# Patient Record
Sex: MALE | Race: WHITE | NOT HISPANIC OR LATINO | URBAN - METROPOLITAN AREA
[De-identification: names, ages, dates, MRNs, and addresses within clinical notes are randomized per-mention and may not be internally consistent; named-entity substitution may affect disease eponyms.]

---

## 2020-01-29 ENCOUNTER — INPATIENT (INPATIENT)
Facility: HOSPITAL | Age: 65
LOS: 5 days | Discharge: HOME HEALTH | DRG: 539 | End: 2020-02-04
Attending: NEUROLOGICAL SURGERY | Admitting: NEUROLOGICAL SURGERY
Payer: COMMERCIAL

## 2020-01-29 VITALS
HEART RATE: 94 BPM | TEMPERATURE: 98 F | WEIGHT: 210.1 LBS | DIASTOLIC BLOOD PRESSURE: 97 MMHG | SYSTOLIC BLOOD PRESSURE: 173 MMHG | RESPIRATION RATE: 17 BRPM | OXYGEN SATURATION: 94 % | HEIGHT: 72 IN

## 2020-01-29 DIAGNOSIS — Z98.890 OTHER SPECIFIED POSTPROCEDURAL STATES: Chronic | ICD-10-CM

## 2020-01-29 LAB
CRP SERPL-MCNC: 15.86 MG/DL — HIGH (ref 0–0.4)
ERYTHROCYTE [SEDIMENTATION RATE] IN BLOOD: 116 MM/HR — HIGH

## 2020-01-29 PROCEDURE — 99223 1ST HOSP IP/OBS HIGH 75: CPT

## 2020-01-29 PROCEDURE — 72156 MRI NECK SPINE W/O & W/DYE: CPT | Mod: 26

## 2020-01-29 PROCEDURE — 99285 EMERGENCY DEPT VISIT HI MDM: CPT

## 2020-01-29 RX ORDER — DEXTROSE 50 % IN WATER 50 %
15 SYRINGE (ML) INTRAVENOUS ONCE
Refills: 0 | Status: DISCONTINUED | OUTPATIENT
Start: 2020-01-29 | End: 2020-01-31

## 2020-01-29 RX ORDER — ACETAMINOPHEN 500 MG
650 TABLET ORAL EVERY 6 HOURS
Refills: 0 | Status: DISCONTINUED | OUTPATIENT
Start: 2020-01-29 | End: 2020-01-31

## 2020-01-29 RX ORDER — SODIUM CHLORIDE 9 MG/ML
1000 INJECTION INTRAMUSCULAR; INTRAVENOUS; SUBCUTANEOUS
Refills: 0 | Status: DISCONTINUED | OUTPATIENT
Start: 2020-01-29 | End: 2020-01-31

## 2020-01-29 RX ORDER — ONDANSETRON 8 MG/1
4 TABLET, FILM COATED ORAL EVERY 6 HOURS
Refills: 0 | Status: DISCONTINUED | OUTPATIENT
Start: 2020-01-29 | End: 2020-01-31

## 2020-01-29 RX ORDER — GLUCAGON INJECTION, SOLUTION 0.5 MG/.1ML
1 INJECTION, SOLUTION SUBCUTANEOUS ONCE
Refills: 0 | Status: DISCONTINUED | OUTPATIENT
Start: 2020-01-29 | End: 2020-01-31

## 2020-01-29 RX ORDER — ONDANSETRON 8 MG/1
4 TABLET, FILM COATED ORAL ONCE
Refills: 0 | Status: COMPLETED | OUTPATIENT
Start: 2020-01-29 | End: 2020-01-29

## 2020-01-29 RX ORDER — SODIUM CHLORIDE 9 MG/ML
1000 INJECTION, SOLUTION INTRAVENOUS
Refills: 0 | Status: DISCONTINUED | OUTPATIENT
Start: 2020-01-29 | End: 2020-01-31

## 2020-01-29 RX ORDER — MORPHINE SULFATE 50 MG/1
4 CAPSULE, EXTENDED RELEASE ORAL ONCE
Refills: 0 | Status: DISCONTINUED | OUTPATIENT
Start: 2020-01-29 | End: 2020-01-29

## 2020-01-29 RX ORDER — DEXTROSE 50 % IN WATER 50 %
12.5 SYRINGE (ML) INTRAVENOUS ONCE
Refills: 0 | Status: DISCONTINUED | OUTPATIENT
Start: 2020-01-29 | End: 2020-01-31

## 2020-01-29 RX ADMIN — ONDANSETRON 4 MILLIGRAM(S): 8 TABLET, FILM COATED ORAL at 21:15

## 2020-01-29 RX ADMIN — MORPHINE SULFATE 4 MILLIGRAM(S): 50 CAPSULE, EXTENDED RELEASE ORAL at 21:14

## 2020-01-29 RX ADMIN — MORPHINE SULFATE 4 MILLIGRAM(S): 50 CAPSULE, EXTENDED RELEASE ORAL at 21:37

## 2020-01-29 NOTE — ED PROVIDER NOTE - OBJECTIVE STATEMENT
63 y/o M with infection diskitis secondary to viral infection, diagnosed by Dr. Jacob who recommended patient to come to ED for further evaluation. Pt has persistent neck pains and headache with no fevers at this time. Pt is A&Ox3 with no focal findings in the ED.

## 2020-01-29 NOTE — ED PROVIDER NOTE - CLINICAL SUMMARY MEDICAL DECISION MAKING FREE TEXT BOX
53 y/o M with infection diskitis. Labs ordered. Case discussed with Dr. Banegas and accepts patient for admission. Dr. Banegas states to hold abx for now until further ID input. 4mg of Morphine was given to pt for pain.

## 2020-01-29 NOTE — ED ADULT NURSE NOTE - OBJECTIVE STATEMENT
pt c/o neck pain and headache with malaise and intermittent fevers since dec that began while on a trip to [place].  pt was seen by neurosurg this week (hx of disc herniation and has private neurosurg) and was sent for labwork and MRI.  pt was told to report to ER for admission today for a viral infection in his spine.

## 2020-01-29 NOTE — ED CLERICAL - NS ED CLERK NOTE PRE-ARRIVAL INFORMATION; ADDITIONAL PRE-ARRIVAL INFORMATION
65 Y/O M ERI SILVA BEING SENT IN BY DR ALARCON FOR INFECTION DISKITIS C-1 AND C-2 ADMIT UNDER DR SHOOK  NEUROSURGERY [phone] DR CEASAR RODRIGUEZ FOR ID

## 2020-01-29 NOTE — ED ADULT NURSE NOTE - NSIMPLEMENTINTERV_GEN_ALL_ED
Implemented All Universal Safety Interventions:  [place] to call system. Call bell, personal items and telephone within reach. Instruct patient to call for assistance. Room bathroom lighting operational. Non-slip footwear when patient is off stretcher. Physically safe environment: no spills, clutter or unnecessary equipment. Stretcher in lowest position, wheels locked, appropriate side rails in place.

## 2020-01-29 NOTE — ED ADULT TRIAGE NOTE - CHIEF COMPLAINT QUOTE
Patient states, "I have an infection in my spine.  Dr. Jacob sent me in to be admitted."  Patient complaining of memory loss, fevers, neck pain and headache for several weeks.  Patient also complaining of numbness to left hand this morning, since resolved.  Patient denies any CP, SOB, or any other complaints at this time.

## 2020-01-30 DIAGNOSIS — M46.42 DISCITIS, UNSPECIFIED, CERVICAL REGION: ICD-10-CM

## 2020-01-30 LAB
ANION GAP SERPL CALC-SCNC: 11 MMOL/L — SIGNIFICANT CHANGE UP (ref 5–17)
BASOPHILS # BLD AUTO: 0.01 K/UL — SIGNIFICANT CHANGE UP (ref 0–0.2)
BASOPHILS NFR BLD AUTO: 0.2 % — SIGNIFICANT CHANGE UP (ref 0–2)
CHLORIDE SERPL-SCNC: 98 MMOL/L — SIGNIFICANT CHANGE UP (ref 96–108)
CO2 SERPL-SCNC: 26 MMOL/L — SIGNIFICANT CHANGE UP (ref 22–31)
EOSINOPHIL # BLD AUTO: 0.15 K/UL — SIGNIFICANT CHANGE UP (ref 0–0.5)
EOSINOPHIL NFR BLD AUTO: 3 % — SIGNIFICANT CHANGE UP (ref 0–6)
HBA1C BLD-MCNC: 5.6 % — SIGNIFICANT CHANGE UP (ref 4–5.6)
HCT VFR BLD CALC: 34.8 % — LOW (ref 39–50)
HCV AB S/CO SERPL IA: 0.18 S/CO — SIGNIFICANT CHANGE UP
HCV AB SERPL-IMP: SIGNIFICANT CHANGE UP
HGB BLD-MCNC: 11.3 G/DL — LOW (ref 13–17)
IMM GRANULOCYTES NFR BLD AUTO: 1 % — SIGNIFICANT CHANGE UP (ref 0–1.5)
LYMPHOCYTES # BLD AUTO: 0.75 K/UL — LOW (ref 1–3.3)
LYMPHOCYTES # BLD AUTO: 15.2 % — SIGNIFICANT CHANGE UP (ref 13–44)
MAGNESIUM SERPL-MCNC: 2.2 MG/DL — SIGNIFICANT CHANGE UP (ref 1.6–2.6)
MCHC RBC-ENTMCNC: 30.2 PG — SIGNIFICANT CHANGE UP (ref 27–34)
MCHC RBC-ENTMCNC: 32.5 GM/DL — SIGNIFICANT CHANGE UP (ref 32–36)
MCV RBC AUTO: 93 FL — SIGNIFICANT CHANGE UP (ref 80–100)
MONOCYTES # BLD AUTO: 0.6 K/UL — SIGNIFICANT CHANGE UP (ref 0–0.9)
MONOCYTES NFR BLD AUTO: 12.1 % — SIGNIFICANT CHANGE UP (ref 2–14)
NEUTROPHILS # BLD AUTO: 3.38 K/UL — SIGNIFICANT CHANGE UP (ref 1.8–7.4)
NEUTROPHILS NFR BLD AUTO: 68.5 % — SIGNIFICANT CHANGE UP (ref 43–77)
NRBC # BLD: 0 /100 WBCS — SIGNIFICANT CHANGE UP (ref 0–0)
PLATELET # BLD AUTO: 332 K/UL — SIGNIFICANT CHANGE UP (ref 150–400)
POTASSIUM SERPL-MCNC: 4.6 MMOL/L — SIGNIFICANT CHANGE UP (ref 3.5–5.3)
POTASSIUM SERPL-SCNC: 4.6 MMOL/L — SIGNIFICANT CHANGE UP (ref 3.5–5.3)
RBC # BLD: 3.74 M/UL — LOW (ref 4.2–5.8)
RBC # FLD: 11.9 % — SIGNIFICANT CHANGE UP (ref 10.3–14.5)
SODIUM SERPL-SCNC: 135 MMOL/L — SIGNIFICANT CHANGE UP (ref 135–145)
WBC # BLD: 4.94 K/UL — SIGNIFICANT CHANGE UP (ref 3.8–10.5)
WBC # FLD AUTO: 4.94 K/UL — SIGNIFICANT CHANGE UP (ref 3.8–10.5)

## 2020-01-30 PROCEDURE — 70450 CT HEAD/BRAIN W/O DYE: CPT | Mod: 26

## 2020-01-30 PROCEDURE — 72125 CT NECK SPINE W/O DYE: CPT | Mod: 26

## 2020-01-30 PROCEDURE — 71045 X-RAY EXAM CHEST 1 VIEW: CPT | Mod: 26

## 2020-01-30 PROCEDURE — 99232 SBSQ HOSP IP/OBS MODERATE 35: CPT

## 2020-01-30 RX ORDER — DIAZEPAM 5 MG
5 TABLET ORAL EVERY 6 HOURS
Refills: 0 | Status: DISCONTINUED | OUTPATIENT
Start: 2020-01-30 | End: 2020-01-31

## 2020-01-30 RX ORDER — HYDROMORPHONE HYDROCHLORIDE 2 MG/ML
0.5 INJECTION INTRAMUSCULAR; INTRAVENOUS; SUBCUTANEOUS
Refills: 0 | Status: DISCONTINUED | OUTPATIENT
Start: 2020-01-30 | End: 2020-01-31

## 2020-01-30 RX ORDER — OXYCODONE AND ACETAMINOPHEN 5; 325 MG/1; MG/1
1 TABLET ORAL EVERY 4 HOURS
Refills: 0 | Status: DISCONTINUED | OUTPATIENT
Start: 2020-01-30 | End: 2020-01-31

## 2020-01-30 RX ORDER — METOPROLOL TARTRATE 50 MG
100 TABLET ORAL DAILY
Refills: 0 | Status: DISCONTINUED | OUTPATIENT
Start: 2020-01-30 | End: 2020-01-31

## 2020-01-30 RX ORDER — POVIDONE-IODINE 5 %
1 AEROSOL (ML) TOPICAL ONCE
Refills: 0 | Status: DISCONTINUED | OUTPATIENT
Start: 2020-01-31 | End: 2020-01-31

## 2020-01-30 RX ORDER — HYDRALAZINE HCL 50 MG
10 TABLET ORAL
Refills: 0 | Status: DISCONTINUED | OUTPATIENT
Start: 2020-01-30 | End: 2020-01-31

## 2020-01-30 RX ADMIN — OXYCODONE AND ACETAMINOPHEN 1 TABLET(S): 5; 325 TABLET ORAL at 09:46

## 2020-01-30 RX ADMIN — OXYCODONE AND ACETAMINOPHEN 1 TABLET(S): 5; 325 TABLET ORAL at 05:43

## 2020-01-30 RX ADMIN — OXYCODONE AND ACETAMINOPHEN 1 TABLET(S): 5; 325 TABLET ORAL at 13:46

## 2020-01-30 RX ADMIN — OXYCODONE AND ACETAMINOPHEN 1 TABLET(S): 5; 325 TABLET ORAL at 14:46

## 2020-01-30 RX ADMIN — HYDROMORPHONE HYDROCHLORIDE 0.5 MILLIGRAM(S): 2 INJECTION INTRAMUSCULAR; INTRAVENOUS; SUBCUTANEOUS at 00:49

## 2020-01-30 RX ADMIN — OXYCODONE AND ACETAMINOPHEN 1 TABLET(S): 5; 325 TABLET ORAL at 18:48

## 2020-01-30 RX ADMIN — OXYCODONE AND ACETAMINOPHEN 1 TABLET(S): 5; 325 TABLET ORAL at 17:48

## 2020-01-30 RX ADMIN — Medication 100 MILLIGRAM(S): at 05:41

## 2020-01-30 RX ADMIN — OXYCODONE AND ACETAMINOPHEN 1 TABLET(S): 5; 325 TABLET ORAL at 10:46

## 2020-01-30 NOTE — PROGRESS NOTE ADULT - SUBJECTIVE AND OBJECTIVE BOX
Surgery:   Consent: Signed by patient vs HCP                   NAME/NUMBER of HCP:     penicillin (Unknown)      OVERNIGHT EVENTS:    T(C): 36.8 (01-30-20 @ 16:24), Max: 37 (01-30-20 @ 00:24)  HR: 84 (01-30-20 @ 16:24) (84 - 107)  BP: 139/88 (01-30-20 @ 16:24) (139/88 - 160/103)  RR: 17 (01-30-20 @ 16:24) (17 - 17)  SpO2: 94% (01-30-20 @ 16:24) (93% - 96%)  Wt(kg): --    EXAM:  Neuro: AOx3, NAD, coherent speech, following commands  CN II-XII: PERRL, EOMI, face symmetric  Motor: MAEx4, strength 5/5 UE and LE b/l, no drift   SILT throughout b/l    Cards: NSR  Resp: unlabored breathing on RA   GI: abd soft, NT, ND       01-30    135  |  98  |  x   ----------------------------<  x   4.6   |  26  |  x     Ca    9.8      29 Jan 2020 20:32  Mg     2.2     01-30    TPro  8.0  /  Alb  3.9  /  TBili  0.4  /  DBili  x   /  AST  26  /  ALT  21  /  AlkPhos  87  01-29    CBC Full  -  ( 30 Jan 2020 08:38 )  WBC Count : 4.94 K/uL  RBC Count : 3.74 M/uL  Hemoglobin : 11.3 g/dL  Hematocrit : 34.8 %  Platelet Count - Automated : 332 K/uL  Mean Cell Volume : 93.0 fl  Mean Cell Hemoglobin : 30.2 pg  Mean Cell Hemoglobin Concentration : 32.5 gm/dL  Auto Neutrophil # : 3.38 K/uL  Auto Lymphocyte # : 0.75 K/uL  Auto Monocyte # : 0.60 K/uL  Auto Eosinophil # : 0.15 K/uL  Auto Basophil # : 0.01 K/uL  Auto Neutrophil % : 68.5 %  Auto Lymphocyte % : 15.2 %  Auto Monocyte % : 12.1 %  Auto Eosinophil % : 3.0 %  Auto Basophil % : 0.2 %    PT/INR - ( 29 Jan 2020 20:32 )   PT: 14.8 sec;   INR: 1.29          PTT - ( 29 Jan 2020 20:32 )  PTT:41.6 sec    Pregnancy test:  Type & Screen (in past 72hrs):    CXR:  EKG:  ECHO:  MRSA swab:   Medical Clearances:  Other Clearances:     Last dose of antiplatelet/anticoagulation drug:    Implanted Devices (pacemaker, drug pump...etc):  []YES   [] NO                  If yes --> EPS consulted to interrogate/adjust device:                 Assessment: Male    Plan:  - OR tomorrow for   - NPO after midnight   - IVF at midnight  - Hold all AC  - 2 units pRBCs on hold   - F/u am labs, coags, T&S  - F/u urine preg, MRSA swab   - F/u CXR, EKG, echo  - Consent in chart   - Medically optimized for OR per    - D/w  Surgery: cervical biopsy, r/o osteo  Consent: Signed by patient vs HCP                   NAME/NUMBER of HCP: Consent in chart- signed by patient    penicillin (Unknown)      OVERNIGHT EVENTS:    T(C): 36.8 (01-30-20 @ 16:24), Max: 37 (01-30-20 @ 00:24)  HR: 84 (01-30-20 @ 16:24) (84 - 107)  BP: 139/88 (01-30-20 @ 16:24) (139/88 - 160/103)  RR: 17 (01-30-20 @ 16:24) (17 - 17)  SpO2: 94% (01-30-20 @ 16:24) (93% - 96%)  Wt(kg): --      EXAM:  Neuro: AOx3, NAD, coherent speech, following commands  CN II-XII grossly intact  Motor: MAEx4, strength 5/5 UE and LE b/l, no drift   SILT throughout b/l        01-30    135  |  98  |  x   ----------------------------<  x   4.6   |  26  |  x     Ca    9.8      29 Jan 2020 20:32  Mg     2.2     01-30    TPro  8.0  /  Alb  3.9  /  TBili  0.4  /  DBili  x   /  AST  26  /  ALT  21  /  AlkPhos  87  01-29    CBC Full  -  ( 30 Jan 2020 08:38 )  WBC Count : 4.94 K/uL  RBC Count : 3.74 M/uL  Hemoglobin : 11.3 g/dL  Hematocrit : 34.8 %  Platelet Count - Automated : 332 K/uL  Mean Cell Volume : 93.0 fl  Mean Cell Hemoglobin : 30.2 pg  Mean Cell Hemoglobin Concentration : 32.5 gm/dL  Auto Neutrophil # : 3.38 K/uL  Auto Lymphocyte # : 0.75 K/uL  Auto Monocyte # : 0.60 K/uL  Auto Eosinophil # : 0.15 K/uL  Auto Basophil # : 0.01 K/uL  Auto Neutrophil % : 68.5 %  Auto Lymphocyte % : 15.2 %  Auto Monocyte % : 12.1 %  Auto Eosinophil % : 3.0 %  Auto Basophil % : 0.2 %    PT/INR - ( 29 Jan 2020 20:32 )   PT: 14.8 sec;   INR: 1.29          PTT - ( 29 Jan 2020 20:32 )  PTT:41.6 sec    Pregnancy test: n/a  Type & Screen (in past 72hrs): pending in am    CXR: pending  EKG: pending   ECHO: pending   M3: pending in am  Medical Clearances: ID (Dr. Bhandari)  Other Clearances:     Last dose of antiplatelet/anticoagulation drug: n/a    Implanted Devices (pacemaker, drug pump...etc):  []YES   [x] NO                  If yes --> EPS consulted to interrogate/adjust device:                 Assessment: 65 yo Male preop for OR tomorrow 1/31    Plan:  - OR tomorrow for cervical biopsy, r/o osteo  - NPO after midnight (except meds)  - IVF at midnight  - Hold all AC  - 2 units pRBCs on hold   - F/u am labs, coags, T&S  - F/u M3 in am  - F/u CXR, EKG, echo  - Consent in chart   - Medically optimized for OR per Dr. Bhandari   - D/w Dr. Banegas

## 2020-01-30 NOTE — H&P ADULT - HISTORY OF PRESENT ILLNESS
Pt is a 64 y.o M PMH HTN presenting with neck pain and HA x 2 weeks. Pt has had intermittent severe suboccipital HA and upper neck pain that have been limiting his ROM. Pt has taken advil every other day with no relief and pain is worse with changes in position. Pt also with intermittent subjective fevers during this time. He received an outpatient MRI with Dr. Jacob which showed possible infection at C1-C2. Pt also reports L hand numbness x 1 day. Pt denies vision changes, paresthesias, weakness, N/V, CP, urinary/bowel incontinence. Pt was recently on vacation in [place].

## 2020-01-30 NOTE — CONSULT NOTE ADULT - PROBLEM SELECTOR RECOMMENDATION 9
I recommend a biopsy of this area to determine the bacteriology of this infection prior to starting antibiotics I recommend a biopsy of this area to determine the bacteriology of this infection prior to starting antibiotics    Echocardiogram TTE   to r/o endocaditis

## 2020-01-30 NOTE — PROGRESS NOTE ADULT - ASSESSMENT
Neurosurgery Attending  -Pt. of Dr. Young Winston (I am covering for Dr. Winston) admitted for r/o neck infection.  Briefly, pt. began with headaches and neck pain about 2 weeks ago without antecedent event. He reports being at a [place] dance club just prior to onset but no particular antecedent event. The pain progressed and he was seen at a [facility] ER with "CT head" and was told it was "normal". He subsequently was evaluated by Dr. Winston (spine surgeon). He related that he had a right anterior shin injury about 10/2019 that occasionally "oozes" but no pain.  Two weeks ago, he also had a productive cough that he attributed to having just had a plane flight; the cough persists today but "much much better". An MRI of the C-spine was obtained yesterday which suggested prevertebral infection from occiput to C5. Dr. Winston is out of town but followed up and spoke with the patient and relayed the situation to me and also spoke with Dr. Marshall Bhandari of infectious disease.  The patient presented to the emergency room and admitted for further workup and treatment. Presently, the patient has neck pain with movement and palpation without extremity radiculopathy.  Afebrile. A&O x3, quite articulate. Motor: 5/5 in all extremities. Sensation intact. Standing and walking normally. Upper cervical tenderness on palpation. Right anterior shin with scabbed linear wound without drainage, without erythema, without drainage. Last night  mm/Hr, CRP 15.86 md/dL. MRI c-spine with contrast performed last night with similar findings as previous study; no epidural neural compression at area in question; alignment normal and maintained but cannot assess bone (eg to r/o fracture) and radiologist recommended further cervical evaluation. Discussed the findings, situation, and plan carefully with the patient. Ample time was spent to ensure all explanations and answers to questions were fully understood to his satisfaction. He is aware that the main management is medical.  =Dr. Marshall Bhandari, infectious disease evaluation and management (his service notified last night of patient's admission)  =CT c-spine (further evaluation) and head (headaches)  =interventional radiology biopsy as per Dr. Bhandari recommendation to Dr. Winston  =cervical collar for patient comfort (he is aware)

## 2020-01-30 NOTE — H&P ADULT - NSHPPHYSICALEXAM_GEN_ALL_CORE
GEN: pt laying in bed, appears well, NAD  NEURO: AOx3. FC, OE spont, speech clear, face symmetric. CNII-XII intact. PERRL, EOMI. No drift. 5/5 strength throughout. SILT  CV: RRR +S1/S2  PULM: CTAB  GI: Abd soft, NT/ND  EXT: Ext warm, nontender, dry

## 2020-01-30 NOTE — H&P ADULT - ASSESSMENT
Pt is a 63 y/o M PMH HTN presents with HA and neck pain x 2 weeks, found to have possible C1-C2 infection on outpatient MRI    Plan:  - neuro/vitals checks  - pain control  - plan for IR guided biopsy 1/30  - NPO at midnight  - Dr. Bhandari consulted, will see pt in am  - hold abx until bx results are in  - [place] collar if needed for comfort  - MRI neck w/ wo contrast pending    Plan d/w Dr. Banegas
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
## 2020-01-30 NOTE — H&P ADULT - NSHPREVIEWOFSYSTEMS_GEN_ALL_CORE
REVIEW OF SYSTEMS      General:	no recent illnesses, no recent wt gain/loss    Skin/Breast:  intact  	  Ophthalmologic:  negative  	  ENMT:	negative    Respiratory and Thorax: no coughing, wheezing, recent URI  	  Cardiovascular: no chest pain, WHITE    Gastrointestinal:	soft, non tender    Genitourinary: no frequency, dysuria    Musculoskeletal:	negative    Neurological:	see HPI    Psychiatric:	negative    Hematology/Lymphatics:	negative    Endocrine:  	negative    Allergic/Immunologic:  Negative

## 2020-01-30 NOTE — PROGRESS NOTE ADULT - ASSESSMENT
Neurosurgery Attending Followup Note and Preop  -Pt. clinically stable with neck pain without radiculopathy and neurologically intact. Cervical collar with some benefit.  -Multiple discussions held throughout the day today. Spoke to Dr. Bhandari (infectious disease) several times this morning: feels tissue diagnosis recommended and strongly against empiric treatment. Spoke to Interventional Radiology (Dr. Hein) in person (I returned to hospital this afternoon): reviewed MRI cspine with and without DIVINE and states there is no fluid for interventional to biopsy and C1/2 area is not area interventionist would biopsy. Spoke to Neuroradiologist (Dr. Monroy) in person: reviewed CT cspine and there is no fracture; also spoke with him concerning MRI cspine with and without findings and he suggested ENT for possible biopsy via transoral route. Spoke to ENT (Dr. Brittnee Barber): transoral route has high risk of contamination (and Dr. Bhandari agrees via 3 way phone call) which would not be beneficial in guiding treatment and there is risk of wound nonhealing. Spoke with Dr. Molina (neurosurgery second opinion). Overall consensus is for anterior cervical approach/dissection and biopsy/incision/drainage targeting the prevertebral/retropharyngeal enhancement tissue  around the midcervical level. I also kept the patient abreast of the discussions throughout the day and also reviewed the radiological studies and findings. Extensively discussed the consensus and he agreed to the surgery.    Preoperative note:  Patient for anterior cervical dissection/approach, biopsy/incision/drainage of r/o infection/abscess, prevertebral/retropharyngeal. Briefly, patient emergently admitted for workup/treatment of r/o neck/spine infection. Pt. began with headaches and neck pain about 2 weeks ago without antecedent event. He reports being [place] a [place] dance [place] just prior to onset but no particular antecedent event. The pain progressed and he was seen at a [facility] with "CT head" and was told it was "normal". He subsequently was evaluated by Dr. Winston (spine surgeon). He related that he had a right anterior shin injury about 10/2019 that occasionally "oozes" but no pain.  Two weeks ago, he also had a productive cough that he attributed to having just had a plane flight; the cough persists today but "much much better". An MRI of the C-spine was obtained 1/29/2020 which suggested prevertebral infection from occiput to C5. Dr. Winston is out of town but followed up and spoke with the patient and relayed the situation to me and also spoke with Dr. Marshall Bhandari of infectious disease.  The patient presented to the emergency room and admitted for further workup and treatment. The patient has significant neck pain without radiculopathy and neurologically intact.  MRI c-spine with and without DIVINE with abnormal enhancement of C1 and C2 vertebral bodies and retropharyngeal/prevertebral space from clivus to C5 (r/o osteomyelitis, infection).  CT c-spine without fracture and maintenance of alignment. Infectious disease consultation requested tissue biopsy to guide therapy.  Extensive discussions with infectious disease, interventional radiology, ENT, and neurosurgery second opinion (Dr. Shay Molina) were with opinion that the only feasible manner was for anterior cervical dissection approach for obtaining tissue. Risks, benefits, and alternatives (including empiric antibiotics without biopsy, which infectious disease was not comfortable with) were extensively discussed with and understood by patient; I had multiple discussions with the patient as well as reviewed his imaging studies.  Among risks include but not only bleeding, infection (superinfection), exposed structures affected (eg. food pipe, windpipe, nerve to voice, artery to brain, spine), nondiagnostic tissue (eg. no growth of bacteria), wound nonhealing and anesthetic risks (eg. coma, death).  All explanations and answers to questions were fully understood and consent given.  =preop for tomorrow  =picc line in preparation for long term antibiotics  =no antibiotics prior to obtaining tissue  =echocardiogram as per id

## 2020-01-30 NOTE — CONSULT NOTE ADULT - ASSESSMENT
Cervical spine osteomyelitis    I spoke to several interventional radiologist who decline to biopsy the cervical spine    Would consider surgical biopsy

## 2020-01-31 LAB
ANION GAP SERPL CALC-SCNC: 12 MMOL/L — SIGNIFICANT CHANGE UP (ref 5–17)
APTT BLD: 39.1 SEC — HIGH (ref 27.5–36.3)
BUN SERPL-MCNC: 10 MG/DL — SIGNIFICANT CHANGE UP (ref 7–23)
CALCIUM SERPL-MCNC: 9.1 MG/DL — SIGNIFICANT CHANGE UP (ref 8.4–10.5)
CHLORIDE SERPL-SCNC: 99 MMOL/L — SIGNIFICANT CHANGE UP (ref 96–108)
CO2 SERPL-SCNC: 25 MMOL/L — SIGNIFICANT CHANGE UP (ref 22–31)
CREAT SERPL-MCNC: 0.9 MG/DL — SIGNIFICANT CHANGE UP (ref 0.5–1.3)
CRP SERPL-MCNC: 10.05 MG/DL — HIGH (ref 0–0.4)
ERYTHROCYTE [SEDIMENTATION RATE] IN BLOOD: 128 MM/HR — HIGH
GLUCOSE SERPL-MCNC: 126 MG/DL — HIGH (ref 70–99)
GRAM STN FLD: SIGNIFICANT CHANGE UP
HCT VFR BLD CALC: 36.2 % — LOW (ref 39–50)
HGB BLD-MCNC: 11.4 G/DL — LOW (ref 13–17)
INR BLD: 1.27 — HIGH (ref 0.88–1.16)
MAGNESIUM SERPL-MCNC: 2.2 MG/DL — SIGNIFICANT CHANGE UP (ref 1.6–2.6)
MCHC RBC-ENTMCNC: 29.7 PG — SIGNIFICANT CHANGE UP (ref 27–34)
MCHC RBC-ENTMCNC: 31.5 GM/DL — LOW (ref 32–36)
MCV RBC AUTO: 94.3 FL — SIGNIFICANT CHANGE UP (ref 80–100)
NRBC # BLD: 0 /100 WBCS — SIGNIFICANT CHANGE UP (ref 0–0)
PHOSPHATE SERPL-MCNC: 3.1 MG/DL — SIGNIFICANT CHANGE UP (ref 2.5–4.5)
PLATELET # BLD AUTO: 316 K/UL — SIGNIFICANT CHANGE UP (ref 150–400)
POTASSIUM SERPL-MCNC: 4.3 MMOL/L — SIGNIFICANT CHANGE UP (ref 3.5–5.3)
POTASSIUM SERPL-SCNC: 4.3 MMOL/L — SIGNIFICANT CHANGE UP (ref 3.5–5.3)
PROTHROM AB SERPL-ACNC: 14.6 SEC — HIGH (ref 10–12.9)
RBC # BLD: 3.84 M/UL — LOW (ref 4.2–5.8)
RBC # FLD: 11.9 % — SIGNIFICANT CHANGE UP (ref 10.3–14.5)
SODIUM SERPL-SCNC: 136 MMOL/L — SIGNIFICANT CHANGE UP (ref 135–145)
SPECIMEN SOURCE: SIGNIFICANT CHANGE UP
WBC # BLD: 5.11 K/UL — SIGNIFICANT CHANGE UP (ref 3.8–10.5)
WBC # FLD AUTO: 5.11 K/UL — SIGNIFICANT CHANGE UP (ref 3.8–10.5)

## 2020-01-31 PROCEDURE — 88304 TISSUE EXAM BY PATHOLOGIST: CPT | Mod: 26

## 2020-01-31 PROCEDURE — 93306 TTE W/DOPPLER COMPLETE: CPT | Mod: 26

## 2020-01-31 RX ORDER — SODIUM CHLORIDE 9 MG/ML
1000 INJECTION INTRAMUSCULAR; INTRAVENOUS; SUBCUTANEOUS
Refills: 0 | Status: DISCONTINUED | OUTPATIENT
Start: 2020-01-31 | End: 2020-02-02

## 2020-01-31 RX ORDER — HYDROMORPHONE HYDROCHLORIDE 2 MG/ML
30 INJECTION INTRAMUSCULAR; INTRAVENOUS; SUBCUTANEOUS
Refills: 0 | Status: DISCONTINUED | OUTPATIENT
Start: 2020-01-31 | End: 2020-02-01

## 2020-01-31 RX ORDER — VANCOMYCIN HCL 1 G
VIAL (EA) INTRAVENOUS
Refills: 0 | Status: DISCONTINUED | OUTPATIENT
Start: 2020-01-31 | End: 2020-02-02

## 2020-01-31 RX ORDER — SENNA PLUS 8.6 MG/1
2 TABLET ORAL AT BEDTIME
Refills: 0 | Status: DISCONTINUED | OUTPATIENT
Start: 2020-01-31 | End: 2020-02-04

## 2020-01-31 RX ORDER — PANTOPRAZOLE SODIUM 20 MG/1
40 TABLET, DELAYED RELEASE ORAL
Refills: 0 | Status: DISCONTINUED | OUTPATIENT
Start: 2020-01-31 | End: 2020-02-04

## 2020-01-31 RX ORDER — VANCOMYCIN HCL 1 G
1250 VIAL (EA) INTRAVENOUS ONCE
Refills: 0 | Status: COMPLETED | OUTPATIENT
Start: 2020-01-31 | End: 2020-01-31

## 2020-01-31 RX ORDER — OXYCODONE AND ACETAMINOPHEN 5; 325 MG/1; MG/1
2 TABLET ORAL EVERY 4 HOURS
Refills: 0 | Status: DISCONTINUED | OUTPATIENT
Start: 2020-01-31 | End: 2020-02-01

## 2020-01-31 RX ORDER — VANCOMYCIN HCL 1 G
750 VIAL (EA) INTRAVENOUS EVERY 12 HOURS
Refills: 0 | Status: DISCONTINUED | OUTPATIENT
Start: 2020-01-31 | End: 2020-01-31

## 2020-01-31 RX ORDER — METOPROLOL TARTRATE 50 MG
100 TABLET ORAL DAILY
Refills: 0 | Status: DISCONTINUED | OUTPATIENT
Start: 2020-01-31 | End: 2020-02-04

## 2020-01-31 RX ORDER — HYDROMORPHONE HYDROCHLORIDE 2 MG/ML
0.5 INJECTION INTRAMUSCULAR; INTRAVENOUS; SUBCUTANEOUS
Refills: 0 | Status: DISCONTINUED | OUTPATIENT
Start: 2020-01-31 | End: 2020-01-31

## 2020-01-31 RX ORDER — SENNA PLUS 8.6 MG/1
2 TABLET ORAL AT BEDTIME
Refills: 0 | Status: DISCONTINUED | OUTPATIENT
Start: 2020-01-31 | End: 2020-01-31

## 2020-01-31 RX ORDER — ONDANSETRON 8 MG/1
4 TABLET, FILM COATED ORAL EVERY 6 HOURS
Refills: 0 | Status: DISCONTINUED | OUTPATIENT
Start: 2020-01-31 | End: 2020-02-04

## 2020-01-31 RX ORDER — ACETAMINOPHEN 500 MG
650 TABLET ORAL EVERY 6 HOURS
Refills: 0 | Status: DISCONTINUED | OUTPATIENT
Start: 2020-01-31 | End: 2020-02-01

## 2020-01-31 RX ORDER — METHOCARBAMOL 500 MG/1
500 TABLET, FILM COATED ORAL EVERY 8 HOURS
Refills: 0 | Status: DISCONTINUED | OUTPATIENT
Start: 2020-01-31 | End: 2020-02-04

## 2020-01-31 RX ORDER — VANCOMYCIN HCL 1 G
1250 VIAL (EA) INTRAVENOUS EVERY 12 HOURS
Refills: 0 | Status: DISCONTINUED | OUTPATIENT
Start: 2020-02-01 | End: 2020-02-02

## 2020-01-31 RX ORDER — SODIUM CHLORIDE 9 MG/ML
1000 INJECTION INTRAMUSCULAR; INTRAVENOUS; SUBCUTANEOUS
Refills: 0 | Status: DISCONTINUED | OUTPATIENT
Start: 2020-01-31 | End: 2020-01-31

## 2020-01-31 RX ADMIN — Medication 200 MILLIGRAM(S): at 18:48

## 2020-01-31 RX ADMIN — OXYCODONE AND ACETAMINOPHEN 1 TABLET(S): 5; 325 TABLET ORAL at 01:29

## 2020-01-31 RX ADMIN — HYDROMORPHONE HYDROCHLORIDE 30 MILLILITER(S): 2 INJECTION INTRAMUSCULAR; INTRAVENOUS; SUBCUTANEOUS at 14:10

## 2020-01-31 RX ADMIN — SODIUM CHLORIDE 75 MILLILITER(S): 9 INJECTION INTRAMUSCULAR; INTRAVENOUS; SUBCUTANEOUS at 13:30

## 2020-01-31 RX ADMIN — OXYCODONE AND ACETAMINOPHEN 1 TABLET(S): 5; 325 TABLET ORAL at 05:34

## 2020-01-31 RX ADMIN — OXYCODONE AND ACETAMINOPHEN 1 TABLET(S): 5; 325 TABLET ORAL at 06:20

## 2020-01-31 RX ADMIN — OXYCODONE AND ACETAMINOPHEN 1 TABLET(S): 5; 325 TABLET ORAL at 00:23

## 2020-01-31 NOTE — PROGRESS NOTE ADULT - ASSESSMENT
POD 0 s/p wound wash out neuro intact  pain control  enc IS use  ADAT  OOB as tolerated  appreciate ID f/u  f/u cultures  cont vanc per ID recs  obtain vanc trough before 4th dose  SCDs  as above d/w Dr. Banegas

## 2020-01-31 NOTE — PACU DISCHARGE NOTE - COMMENTS
Report called to HILARIA Taylor of [address] [place], [place] [address]an. Report called to HILARIA Taylor of  [facility]. Pt cleared by anesthesia, recovery stay uneventful. Pt is AAO x 3, neuro checks WNL. PCA pump in place. IVF in  progress. Pt tolerated crackers and juice - pending NPO order discontinuation by NS, spoke w team prior. Pt transported to  933 without incident w cont cardiac, oxygen and NIBP monitoring in progress, endorsed to HILARIA Bailey at bedside. PCA pump settings verified per protocol.

## 2020-01-31 NOTE — PACU DISCHARGE NOTE - COMMENTS
Pt is AAO x 3, he has been cleared for discharge by Anesthesia and  Vascular Surgery, seen by MICHELLE Muñoz at bedside. Discharge instructions and medications discussed with patient, patient verbalized understanding. Right groin dressing c/d/i, no hematoma or bleeding. Pt states he is getting home via car service - JENNIFER Bains at bedside and aware.

## 2020-01-31 NOTE — PROGRESS NOTE ADULT - SUBJECTIVE AND OBJECTIVE BOX
INTERVAL HPI/OVERNIGHT EVENTS:    ANTIBIOTICS    MEDICATIONS  (STANDING):  HYDROmorphone PCA (1 mG/mL) 30 milliLiter(s) PCA Continuous PCA Continuous  metoprolol succinate  milliGRAM(s) Oral daily  pantoprazole    Tablet 40 milliGRAM(s) Oral before breakfast  senna 2 Tablet(s) Oral at bedtime  sodium chloride 0.9%. 1000 milliLiter(s) (75 mL/Hr) IV Continuous <Continuous>    MEDICATIONS  (PRN):  acetaminophen   Tablet .. 650 milliGRAM(s) Oral every 6 hours PRN Temp greater or equal to 38C (100.4F), Moderate Pain (4 - 6)  bisacodyl 5 milliGRAM(s) Oral daily PRN Constipation  methocarbamol 500 milliGRAM(s) Oral every 8 hours PRN Muscle Spasm  ondansetron Injectable 4 milliGRAM(s) IV Push every 6 hours PRN Nausea and/or Vomiting  oxycodone    5 mG/acetaminophen 325 mG 2 Tablet(s) Oral every 4 hours PRN Severe Pain (7 - 10)      Allergies    penicillin (Unknown)    Intolerances        REVIEW OF SYSTEMS:      Eyes: No eye pain, visual disturbances, or discharge  ENMT:  No difficulty hearing, tinnitus, vertigo; No sinus or throat pain  Neck: No pain or stiffness  Respiratory: No cough, wheezing, chills or hemoptysis  Cardiovascular: No chest pain, palpitations, shortness of breath, dizziness or leg swelling  Gastrointestinal: No abdominal or epigastric pain. No nausea, vomiting or hematemesis; No diarrhea or constipation. No melena or hematochezia.  Genitourinary: No dysuria, frequency, hematuria or incontinence  Rectal: No pain, hemorrhoids or incontinence  Neurological: No headaches, memory loss, loss of strength, numbness or tremors  Skin: No itching, burning, rashes or lesions   Lymph Nodes: No enlarged glands  Endocrine: No heat or cold intolerance; No hair loss    Vital Signs Last 24 Hrs  T(C): 36.9 (31 Jan 2020 12:18), Max: 37.1 (31 Jan 2020 00:50)  T(F): 98.4 (31 Jan 2020 12:18), Max: 98.8 (31 Jan 2020 00:50)  HR: 80 (31 Jan 2020 14:03) (69 - 89)  BP: 141/78 (31 Jan 2020 14:03) (119/70 - 141/78)  BP(mean): 101 (31 Jan 2020 14:03) (97 - 102)  RR: 14 (31 Jan 2020 14:03) (12 - 17)  SpO2: 96% (31 Jan 2020 14:03) (93% - 99%)    PHYSICAL EXAM:    General: Well developed; well nourished; in no acute distress  Eyes: PERRL, EOM intact; conjunctiva and sclera clear  Head: Normocephalic; atraumatic  ENMT: No nasal discharge; airway clear  Neck: Supple; non tender; no masses  Respiratory: No wheezes, rales or rhonchi  Cardiovascular: Regular rate and rhythm. S1 and S2 Normal; No murmurs, gallops or rubs  Gastrointestinal: Soft non-tender non-distended; Normal bowel sounds; No hepatosplenomegaly  Genitourinary: No costovertebral angle tenderness  Extremities: Normal range of motion, No clubbing, cyanosis or edema  Vascular: Peripheral pulses palpable 2+ bilaterally  Neurological: Alert and oriented x3  Skin: Warm and dry. No acute rash  Lymph Nodes: No acute cervical adenopathy  Musculoskeletal: Normal gait, tone, without deformities    LABS:                        11.4   5.11  )-----------( 316      ( 31 Jan 2020 06:59 )             36.2     01-31    136  |  99  |  10  ----------------------------<  126<H>  4.3   |  25  |  0.90    Ca    9.1      31 Jan 2020 06:59  Phos  3.1     01-31  Mg     2.2     01-31    TPro  8.0  /  Alb  3.9  /  TBili  0.4  /  DBili  x   /  AST  26  /  ALT  21  /  AlkPhos  87  01-29    PT/INR - ( 31 Jan 2020 06:59 )   PT: 14.6 sec;   INR: 1.27          PTT - ( 31 Jan 2020 06:59 )  PTT:39.1 sec        MICROBIOLOGY:  Culture Results:   No growth at 1 day. (01-29 @ 21:30)  Culture Results:   No growth at 1 day. (01-29 @ 21:30)      RADIOLOGY & ADDITIONAL STUDIES:

## 2020-01-31 NOTE — PROGRESS NOTE ADULT - SUBJECTIVE AND OBJECTIVE BOX
Post op Note    Dx: cervical wound infection    64y    Male   s/p wash out earlier today. No complications intra op patient seen and examined at the bedside without complains post op.          T(C): 36.3 (01-31-20 @ 17:00), Max: 37.1 (01-31-20 @ 00:50)  HR: 94 (01-31-20 @ 17:00) (69 - 98)  BP: 126/74 (01-31-20 @ 17:00) (119/70 - 144/82)  RR: 17 (01-31-20 @ 17:00) (12 - 20)  SpO2: 96% (01-31-20 @ 17:00) (93% - 99%)  Wt(kg): --      Physical exam  Awake, alert, oriented x 3, PERRL, EOMI  Follows commands, speech clear  DUNLAP X4 with good strength   Incision: C/D/I

## 2020-02-01 LAB
ALBUMIN SERPL ELPH-MCNC: 3.3 G/DL — SIGNIFICANT CHANGE UP (ref 3.3–5)
ALP SERPL-CCNC: 68 U/L — SIGNIFICANT CHANGE UP (ref 40–120)
ALT FLD-CCNC: 12 U/L — SIGNIFICANT CHANGE UP (ref 10–45)
ANION GAP SERPL CALC-SCNC: 12 MMOL/L — SIGNIFICANT CHANGE UP (ref 5–17)
APTT BLD: 34.4 SEC — SIGNIFICANT CHANGE UP (ref 27.5–36.3)
AST SERPL-CCNC: 18 U/L — SIGNIFICANT CHANGE UP (ref 10–40)
BASOPHILS # BLD AUTO: 0.01 K/UL — SIGNIFICANT CHANGE UP (ref 0–0.2)
BASOPHILS NFR BLD AUTO: 0.2 % — SIGNIFICANT CHANGE UP (ref 0–2)
BILIRUB SERPL-MCNC: 0.2 MG/DL — SIGNIFICANT CHANGE UP (ref 0.2–1.2)
BUN SERPL-MCNC: 9 MG/DL — SIGNIFICANT CHANGE UP (ref 7–23)
CALCIUM SERPL-MCNC: 8.6 MG/DL — SIGNIFICANT CHANGE UP (ref 8.4–10.5)
CHLORIDE SERPL-SCNC: 101 MMOL/L — SIGNIFICANT CHANGE UP (ref 96–108)
CO2 SERPL-SCNC: 22 MMOL/L — SIGNIFICANT CHANGE UP (ref 22–31)
CREAT SERPL-MCNC: 0.77 MG/DL — SIGNIFICANT CHANGE UP (ref 0.5–1.3)
EOSINOPHIL # BLD AUTO: 0.01 K/UL — SIGNIFICANT CHANGE UP (ref 0–0.5)
EOSINOPHIL NFR BLD AUTO: 0.2 % — SIGNIFICANT CHANGE UP (ref 0–6)
GLUCOSE SERPL-MCNC: 106 MG/DL — HIGH (ref 70–99)
HCT VFR BLD CALC: 33 % — LOW (ref 39–50)
HGB BLD-MCNC: 10.5 G/DL — LOW (ref 13–17)
IMM GRANULOCYTES NFR BLD AUTO: 0.6 % — SIGNIFICANT CHANGE UP (ref 0–1.5)
INR BLD: 1.21 — HIGH (ref 0.88–1.16)
LYMPHOCYTES # BLD AUTO: 1.11 K/UL — SIGNIFICANT CHANGE UP (ref 1–3.3)
LYMPHOCYTES # BLD AUTO: 17.2 % — SIGNIFICANT CHANGE UP (ref 13–44)
MCHC RBC-ENTMCNC: 29.7 PG — SIGNIFICANT CHANGE UP (ref 27–34)
MCHC RBC-ENTMCNC: 31.8 GM/DL — LOW (ref 32–36)
MCV RBC AUTO: 93.2 FL — SIGNIFICANT CHANGE UP (ref 80–100)
MONOCYTES # BLD AUTO: 0.65 K/UL — SIGNIFICANT CHANGE UP (ref 0–0.9)
MONOCYTES NFR BLD AUTO: 10.1 % — SIGNIFICANT CHANGE UP (ref 2–14)
NEUTROPHILS # BLD AUTO: 4.63 K/UL — SIGNIFICANT CHANGE UP (ref 1.8–7.4)
NEUTROPHILS NFR BLD AUTO: 71.7 % — SIGNIFICANT CHANGE UP (ref 43–77)
NIGHT BLUE STAIN TISS: SIGNIFICANT CHANGE UP
NRBC # BLD: 0 /100 WBCS — SIGNIFICANT CHANGE UP (ref 0–0)
PLATELET # BLD AUTO: 299 K/UL — SIGNIFICANT CHANGE UP (ref 150–400)
POTASSIUM SERPL-MCNC: 4.1 MMOL/L — SIGNIFICANT CHANGE UP (ref 3.5–5.3)
POTASSIUM SERPL-SCNC: 4.1 MMOL/L — SIGNIFICANT CHANGE UP (ref 3.5–5.3)
PROT SERPL-MCNC: 6.9 G/DL — SIGNIFICANT CHANGE UP (ref 6–8.3)
PROTHROM AB SERPL-ACNC: 13.9 SEC — HIGH (ref 10–12.9)
RBC # BLD: 3.54 M/UL — LOW (ref 4.2–5.8)
RBC # FLD: 11.5 % — SIGNIFICANT CHANGE UP (ref 10.3–14.5)
SODIUM SERPL-SCNC: 135 MMOL/L — SIGNIFICANT CHANGE UP (ref 135–145)
SPECIMEN SOURCE: SIGNIFICANT CHANGE UP
VANCOMYCIN TROUGH SERPL-MCNC: 11.5 UG/ML — SIGNIFICANT CHANGE UP (ref 10–20)
WBC # BLD: 6.45 K/UL — SIGNIFICANT CHANGE UP (ref 3.8–10.5)
WBC # FLD AUTO: 6.45 K/UL — SIGNIFICANT CHANGE UP (ref 3.8–10.5)

## 2020-02-01 PROCEDURE — 73590 X-RAY EXAM OF LOWER LEG: CPT | Mod: 26,RT

## 2020-02-01 RX ORDER — HYDROMORPHONE HYDROCHLORIDE 2 MG/ML
0.5 INJECTION INTRAMUSCULAR; INTRAVENOUS; SUBCUTANEOUS ONCE
Refills: 0 | Status: DISCONTINUED | OUTPATIENT
Start: 2020-02-01 | End: 2020-02-01

## 2020-02-01 RX ORDER — MAGNESIUM HYDROXIDE 400 MG/1
30 TABLET, CHEWABLE ORAL DAILY
Refills: 0 | Status: DISCONTINUED | OUTPATIENT
Start: 2020-02-01 | End: 2020-02-04

## 2020-02-01 RX ORDER — HYDRALAZINE HCL 50 MG
5 TABLET ORAL
Refills: 0 | Status: DISCONTINUED | OUTPATIENT
Start: 2020-02-01 | End: 2020-02-04

## 2020-02-01 RX ORDER — HYDROMORPHONE HYDROCHLORIDE 2 MG/ML
4 INJECTION INTRAMUSCULAR; INTRAVENOUS; SUBCUTANEOUS EVERY 6 HOURS
Refills: 0 | Status: DISCONTINUED | OUTPATIENT
Start: 2020-02-01 | End: 2020-02-04

## 2020-02-01 RX ORDER — ZOLPIDEM TARTRATE 10 MG/1
5 TABLET ORAL AT BEDTIME
Refills: 0 | Status: DISCONTINUED | OUTPATIENT
Start: 2020-02-01 | End: 2020-02-04

## 2020-02-01 RX ORDER — ACETAMINOPHEN 500 MG
650 TABLET ORAL EVERY 6 HOURS
Refills: 0 | Status: DISCONTINUED | OUTPATIENT
Start: 2020-02-01 | End: 2020-02-04

## 2020-02-01 RX ORDER — HYDROMORPHONE HYDROCHLORIDE 2 MG/ML
2 INJECTION INTRAMUSCULAR; INTRAVENOUS; SUBCUTANEOUS EVERY 4 HOURS
Refills: 0 | Status: DISCONTINUED | OUTPATIENT
Start: 2020-02-01 | End: 2020-02-04

## 2020-02-01 RX ADMIN — METHOCARBAMOL 500 MILLIGRAM(S): 500 TABLET, FILM COATED ORAL at 16:18

## 2020-02-01 RX ADMIN — Medication 650 MILLIGRAM(S): at 13:32

## 2020-02-01 RX ADMIN — HYDROMORPHONE HYDROCHLORIDE 4 MILLIGRAM(S): 2 INJECTION INTRAMUSCULAR; INTRAVENOUS; SUBCUTANEOUS at 10:28

## 2020-02-01 RX ADMIN — Medication 650 MILLIGRAM(S): at 14:30

## 2020-02-01 RX ADMIN — HYDROMORPHONE HYDROCHLORIDE 4 MILLIGRAM(S): 2 INJECTION INTRAMUSCULAR; INTRAVENOUS; SUBCUTANEOUS at 11:25

## 2020-02-01 RX ADMIN — Medication 200 MILLIGRAM(S): at 19:20

## 2020-02-01 RX ADMIN — HYDROMORPHONE HYDROCHLORIDE 0.5 MILLIGRAM(S): 2 INJECTION INTRAMUSCULAR; INTRAVENOUS; SUBCUTANEOUS at 20:54

## 2020-02-01 RX ADMIN — SENNA PLUS 2 TABLET(S): 8.6 TABLET ORAL at 20:54

## 2020-02-01 RX ADMIN — SODIUM CHLORIDE 75 MILLILITER(S): 9 INJECTION INTRAMUSCULAR; INTRAVENOUS; SUBCUTANEOUS at 06:07

## 2020-02-01 RX ADMIN — HYDROMORPHONE HYDROCHLORIDE 4 MILLIGRAM(S): 2 INJECTION INTRAMUSCULAR; INTRAVENOUS; SUBCUTANEOUS at 17:40

## 2020-02-01 RX ADMIN — HYDROMORPHONE HYDROCHLORIDE 0.5 MILLIGRAM(S): 2 INJECTION INTRAMUSCULAR; INTRAVENOUS; SUBCUTANEOUS at 21:15

## 2020-02-01 RX ADMIN — Medication 200 MILLIGRAM(S): at 06:58

## 2020-02-01 RX ADMIN — Medication 100 MILLIGRAM(S): at 07:55

## 2020-02-01 RX ADMIN — Medication 5 MILLIGRAM(S): at 13:32

## 2020-02-01 RX ADMIN — PANTOPRAZOLE SODIUM 40 MILLIGRAM(S): 20 TABLET, DELAYED RELEASE ORAL at 06:58

## 2020-02-01 RX ADMIN — HYDROMORPHONE HYDROCHLORIDE 4 MILLIGRAM(S): 2 INJECTION INTRAMUSCULAR; INTRAVENOUS; SUBCUTANEOUS at 16:47

## 2020-02-01 NOTE — PROGRESS NOTE ADULT - ASSESSMENT
Neurosurgery Attending  -Patient in bed; comfortable; moving head and neck better with pain medication; no radiculopathy; tolerating PO; voided;  -AFebrile;  Moving all extremities purposefully;  Neck with tenderness but stable; no collar and patient moving neck much improved;  Wound: clean, dry, intact with Dermabond; no swelling; no erythema  -AP: Neurologically stable s/p cervical prevertebral/retropharyngeal exploration/biopsy/culture.  Operative specimens (multiple culture specimens x9 tubes including tissue were sent) gram stain and cultures negative thusfar.  As discussed preoperatively and again reviewed today, patient is aware that the cultures may return as negative (up to 50% chance). Pt. is in good spirits and understands management is medical/infectious disease.  =oob and ambulation (collar only prn for comfort)  =may be discharged from neurosurgical/wound standpoint but in hospital pending infectious disease issues  =management as per infectious disease Dr. Bhandari (on Vancomycin) Neurosurgery Attending  -Patient in bed; comfortable; moving head and neck better with pain medication; no radiculopathy; tolerating PO; voided;  -AFebrile;  Moving all extremities purposefully;  Neck with tenderness but stable; no collar and patient moving neck much improved;  Wound: clean, dry, intact with Dermabond; no swelling; no erythema  -AP: Neurologically stable s/p cervical prevertebral/retropharyngeal exploration/biopsy/culture.  Operative specimens (multiple culture specimens x9 tubes including tissue were sent) gram stain and cultures negative thusfar.  As discussed preoperatively and again reviewed today, patient is aware that the cultures may return as negative (up to 50% chance). Pt. is in good spirits and understands management is medical/infectious disease.  =oob and ambulation (collar only prn for comfort)  =pain control as per pain management  =may be discharged from neurosurgical/wound standpoint but in hospital pending infectious disease issues  =management as per infectious disease Dr. Bhandari (on Vancomycin)

## 2020-02-01 NOTE — PROGRESS NOTE ADULT - SUBJECTIVE AND OBJECTIVE BOX
HPI:  Pt is a 64 y.o M PMH HTN presenting with neck pain and HA x 2 weeks. Pt has had intermittent severe suboccipital HA and upper neck pain that have been limiting his ROM. Pt has taken advil every other day with no relief and pain is worse with changes in position. Pt also with intermittent subjective fevers during this time. He received an outpatient MRI with Dr. Jacob which showed possible infection at C1-C2. Pt also reports L hand numbness x 1 day. Pt denies vision changes, paresthesias, weakness, N/V, CP, urinary/bowel incontinence. Pt was recently on vacation in [place]. (30 Jan 2020 00:00)    Hospital Course:  POD 0: s/p washout  POD 1: LORIN overnight cultures pending, maintained on vanco q12h    OVERNIGHT EVENTS:  Vital Signs Last 24 Hrs  T(C): 36.5 (31 Jan 2020 21:19), Max: 37.1 (31 Jan 2020 00:50)  T(F): 97.7 (31 Jan 2020 21:19), Max: 98.8 (31 Jan 2020 00:50)  HR: 101 (31 Jan 2020 21:19) (69 - 101)  BP: 146/84 (31 Jan 2020 21:19) (126/74 - 146/84)  BP(mean): 101 (31 Jan 2020 16:30) (97 - 107)  RR: 18 (31 Jan 2020 21:19) (12 - 20)  SpO2: 92% (31 Jan 2020 21:19) (92% - 99%)    I&O's Summary    30 Jan 2020 07:01  -  31 Jan 2020 07:00  --------------------------------------------------------  IN: 1775 mL / OUT: 950 mL / NET: 825 mL    31 Jan 2020 07:01  -  01 Feb 2020 00:05  --------------------------------------------------------  IN: 1061 mL / OUT: 500 mL / NET: 561 mL        PHYSICAL EXAM:  Awake, alert, oriented x 3, PERRL, EOMI  Follows commands, speech clear  DUNLAP X4 with good strength   Incision: C/D/I    TUBES/LINES:  pivs    DIET:  [] NPO  [x] Mechanical  [] Tube feeds    LABS:                        11.4   5.11  )-----------( 316      ( 31 Jan 2020 06:59 )             36.2     01-31    136  |  99  |  10  ----------------------------<  126<H>  4.3   |  25  |  0.90    Ca    9.1      31 Jan 2020 06:59  Phos  3.1     01-31  Mg     2.2     01-31      PT/INR - ( 31 Jan 2020 06:59 )   PT: 14.6 sec;   INR: 1.27          PTT - ( 31 Jan 2020 06:59 )  PTT:39.1 sec        CAPILLARY BLOOD GLUCOSE          Drug Levels: [] N/A    CSF Analysis: [] N/A      Allergies    penicillin (Unknown)    Intolerances      MEDICATIONS:  Antibiotics:  vancomycin  IVPB        Neuro:  acetaminophen   Tablet .. 650 milliGRAM(s) Oral every 6 hours PRN  HYDROmorphone PCA (1 mG/mL) 30 milliLiter(s) PCA Continuous PCA Continuous  methocarbamol 500 milliGRAM(s) Oral every 8 hours PRN  ondansetron Injectable 4 milliGRAM(s) IV Push every 6 hours PRN  oxycodone    5 mG/acetaminophen 325 mG 2 Tablet(s) Oral every 4 hours PRN    Anticoagulation:    OTHER:  bisacodyl 5 milliGRAM(s) Oral daily PRN  metoprolol succinate  milliGRAM(s) Oral daily  pantoprazole    Tablet 40 milliGRAM(s) Oral before breakfast  senna 2 Tablet(s) Oral at bedtime    IVF:  sodium chloride 0.9%. 1000 milliLiter(s) IV Continuous <Continuous>    CULTURES:  Culture Results:   No growth at 2 days. (01-29 @ 21:30)  Culture Results:   No growth at 2 days. (01-29 @ 21:30)    RADIOLOGY & ADDITIONAL TESTS:      ASSESSMENT:  POD 1 s/p wound wash out neuro intact  pain control  enc IS use  ADAT  OOB as tolerated  appreciate ID f/u  f/u cultures  f/u vanc trough today  cont vanc per ID recs  SCDs  as above d/w Dr. Banegas

## 2020-02-02 PROCEDURE — 99231 SBSQ HOSP IP/OBS SF/LOW 25: CPT

## 2020-02-02 RX ORDER — DAPTOMYCIN 500 MG/10ML
INJECTION, POWDER, LYOPHILIZED, FOR SOLUTION INTRAVENOUS
Refills: 0 | Status: DISCONTINUED | OUTPATIENT
Start: 2020-02-02 | End: 2020-02-04

## 2020-02-02 RX ORDER — CEFTRIAXONE 500 MG/1
INJECTION, POWDER, FOR SOLUTION INTRAMUSCULAR; INTRAVENOUS
Refills: 0 | Status: DISCONTINUED | OUTPATIENT
Start: 2020-02-02 | End: 2020-02-04

## 2020-02-02 RX ORDER — ENOXAPARIN SODIUM 100 MG/ML
40 INJECTION SUBCUTANEOUS EVERY 24 HOURS
Refills: 0 | Status: DISCONTINUED | OUTPATIENT
Start: 2020-02-02 | End: 2020-02-04

## 2020-02-02 RX ORDER — DAPTOMYCIN 500 MG/10ML
750 INJECTION, POWDER, LYOPHILIZED, FOR SOLUTION INTRAVENOUS EVERY 24 HOURS
Refills: 0 | Status: DISCONTINUED | OUTPATIENT
Start: 2020-02-03 | End: 2020-02-04

## 2020-02-02 RX ORDER — DAPTOMYCIN 500 MG/10ML
750 INJECTION, POWDER, LYOPHILIZED, FOR SOLUTION INTRAVENOUS ONCE
Refills: 0 | Status: COMPLETED | OUTPATIENT
Start: 2020-02-02 | End: 2020-02-02

## 2020-02-02 RX ORDER — CEFTRIAXONE 500 MG/1
2000 INJECTION, POWDER, FOR SOLUTION INTRAMUSCULAR; INTRAVENOUS EVERY 24 HOURS
Refills: 0 | Status: DISCONTINUED | OUTPATIENT
Start: 2020-02-03 | End: 2020-02-04

## 2020-02-02 RX ORDER — CEFTRIAXONE 500 MG/1
2000 INJECTION, POWDER, FOR SOLUTION INTRAMUSCULAR; INTRAVENOUS ONCE
Refills: 0 | Status: COMPLETED | OUTPATIENT
Start: 2020-02-02 | End: 2020-02-02

## 2020-02-02 RX ADMIN — Medication 650 MILLIGRAM(S): at 01:40

## 2020-02-02 RX ADMIN — METHOCARBAMOL 500 MILLIGRAM(S): 500 TABLET, FILM COATED ORAL at 22:49

## 2020-02-02 RX ADMIN — Medication 650 MILLIGRAM(S): at 02:40

## 2020-02-02 RX ADMIN — Medication 100 MILLIGRAM(S): at 06:13

## 2020-02-02 RX ADMIN — Medication 650 MILLIGRAM(S): at 23:52

## 2020-02-02 RX ADMIN — MAGNESIUM HYDROXIDE 30 MILLILITER(S): 400 TABLET, CHEWABLE ORAL at 16:00

## 2020-02-02 RX ADMIN — Medication 650 MILLIGRAM(S): at 12:12

## 2020-02-02 RX ADMIN — HYDROMORPHONE HYDROCHLORIDE 2 MILLIGRAM(S): 2 INJECTION INTRAMUSCULAR; INTRAVENOUS; SUBCUTANEOUS at 05:30

## 2020-02-02 RX ADMIN — SENNA PLUS 2 TABLET(S): 8.6 TABLET ORAL at 21:14

## 2020-02-02 RX ADMIN — Medication 5 MILLIGRAM(S): at 23:56

## 2020-02-02 RX ADMIN — Medication 650 MILLIGRAM(S): at 17:48

## 2020-02-02 RX ADMIN — HYDROMORPHONE HYDROCHLORIDE 4 MILLIGRAM(S): 2 INJECTION INTRAMUSCULAR; INTRAVENOUS; SUBCUTANEOUS at 00:21

## 2020-02-02 RX ADMIN — Medication 650 MILLIGRAM(S): at 18:20

## 2020-02-02 RX ADMIN — PANTOPRAZOLE SODIUM 40 MILLIGRAM(S): 20 TABLET, DELAYED RELEASE ORAL at 06:13

## 2020-02-02 RX ADMIN — METHOCARBAMOL 500 MILLIGRAM(S): 500 TABLET, FILM COATED ORAL at 12:12

## 2020-02-02 RX ADMIN — DAPTOMYCIN 130 MILLIGRAM(S): 500 INJECTION, POWDER, LYOPHILIZED, FOR SOLUTION INTRAVENOUS at 13:29

## 2020-02-02 RX ADMIN — HYDROMORPHONE HYDROCHLORIDE 4 MILLIGRAM(S): 2 INJECTION INTRAMUSCULAR; INTRAVENOUS; SUBCUTANEOUS at 01:20

## 2020-02-02 RX ADMIN — ENOXAPARIN SODIUM 40 MILLIGRAM(S): 100 INJECTION SUBCUTANEOUS at 21:14

## 2020-02-02 RX ADMIN — METHOCARBAMOL 500 MILLIGRAM(S): 500 TABLET, FILM COATED ORAL at 01:41

## 2020-02-02 RX ADMIN — Medication 5 MILLIGRAM(S): at 00:14

## 2020-02-02 RX ADMIN — Medication 5 MILLIGRAM(S): at 22:51

## 2020-02-02 RX ADMIN — CEFTRIAXONE 100 MILLIGRAM(S): 500 INJECTION, POWDER, FOR SOLUTION INTRAMUSCULAR; INTRAVENOUS at 12:13

## 2020-02-02 RX ADMIN — Medication 650 MILLIGRAM(S): at 12:45

## 2020-02-02 RX ADMIN — Medication 200 MILLIGRAM(S): at 06:13

## 2020-02-02 RX ADMIN — HYDROMORPHONE HYDROCHLORIDE 2 MILLIGRAM(S): 2 INJECTION INTRAMUSCULAR; INTRAVENOUS; SUBCUTANEOUS at 04:37

## 2020-02-02 NOTE — PROGRESS NOTE ADULT - SUBJECTIVE AND OBJECTIVE BOX
HPI:  Pt is a 64 y.o M PMH HTN presenting with neck pain and HA x 2 weeks. Pt has had intermittent severe suboccipital HA and upper neck pain that have been limiting his ROM. Pt has taken advil every other day with no relief and pain is worse with changes in position. Pt also with intermittent subjective fevers during this time. He received an outpatient MRI with Dr. Jacob which showed possible infection at C1-C2. Pt also reports L hand numbness x 1 day. Pt denies vision changes, paresthesias, weakness, N/V, CP, urinary/bowel incontinence. Pt was recently on vacation in [place]. (30 Jan 2020 00:00)    OVERNIGHT EVENTS: Some reports of pain overnight now improved this morning. Tolerating OOB and PO diet.    Hospital Course:  POD 0: s/p washout and culture of prior ACDF site.  POD 1: LORIN overnight cultures pending, maintained on vanco q12h  POD#2 2/2: Pending OR culture results, ID following, on Vanco. Possible PICC line. Pain well controlled.    Vital Signs Last 24 Hrs  T(C): 36.8 (02 Feb 2020 06:10), Max: 37.1 (01 Feb 2020 13:21)  T(F): 98.3 (02 Feb 2020 06:10), Max: 98.8 (01 Feb 2020 13:21)  HR: 79 (02 Feb 2020 06:10) (79 - 97)  BP: 143/91 (02 Feb 2020 06:10) (143/91 - 170/90)  BP(mean): --  RR: 16 (02 Feb 2020 06:10) (16 - 18)  SpO2: 93% (02 Feb 2020 06:10) (93% - 95%)    I&O's Summary    01 Feb 2020 07:01  -  02 Feb 2020 07:00  --------------------------------------------------------  IN: 1450 mL / OUT: 1650 mL / NET: -200 mL        PHYSICAL EXAM:  Gen: NAD, AAOx3  HEENT: PERRL. EOMI. VF intact  Neck: Right anterior incision C/D/I.  Lungs: Clear b/l  Heart: S1, S2. NSR.  Abd: Soft, NT/ND. +BS  Exts: Pulses 2+ throughout  Neuro: CNs II-XII intact. 5/5 str x4 extremities. Sensation to LT intact throughout. Following commands. Speech clear.    TUBES/LINES:  [] Petit  [] Lumbar Drain  [] Wound Drains  [] Others      DIET:  [] NPO  [x] Mechanical  [] Tube feeds    LABS:                        10.5   6.45  )-----------( 299      ( 01 Feb 2020 07:03 )             33.0     02-01    135  |  101  |  9   ----------------------------<  106<H>  4.1   |  22  |  0.77    Ca    8.6      01 Feb 2020 07:03    TPro  6.9  /  Alb  3.3  /  TBili  0.2  /  DBili  x   /  AST  18  /  ALT  12  /  AlkPhos  68  02-01    PT/INR - ( 01 Feb 2020 07:03 )   PT: 13.9 sec;   INR: 1.21          PTT - ( 01 Feb 2020 07:03 )  PTT:34.4 sec        CAPILLARY BLOOD GLUCOSE          Drug Levels: [] N/A  Vancomycin Level, Trough: 11.5 ug/mL (02-01 @ 17:54)    CSF Analysis: [] N/A      Allergies    penicillin (Unknown)    Intolerances      MEDICATIONS:  Antibiotics:  vancomycin  IVPB      vancomycin  IVPB 1250 milliGRAM(s) IV Intermittent every 12 hours    Neuro:  acetaminophen   Tablet .. 650 milliGRAM(s) Oral every 6 hours PRN  HYDROmorphone   Tablet 2 milliGRAM(s) Oral every 4 hours PRN  HYDROmorphone   Tablet 4 milliGRAM(s) Oral every 6 hours PRN  methocarbamol 500 milliGRAM(s) Oral every 8 hours PRN  ondansetron Injectable 4 milliGRAM(s) IV Push every 6 hours PRN  zolpidem 5 milliGRAM(s) Oral at bedtime PRN  zolpidem 5 milliGRAM(s) Oral at bedtime PRN    Anticoagulation:  enoxaparin Injectable 40 milliGRAM(s) SubCutaneous every 24 hours    OTHER:  bisacodyl 5 milliGRAM(s) Oral daily PRN  hydrALAZINE Injectable 5 milliGRAM(s) IV Push every 2 hours PRN  magnesium hydroxide Suspension 30 milliLiter(s) Oral daily PRN  metoprolol succinate  milliGRAM(s) Oral daily  pantoprazole    Tablet 40 milliGRAM(s) Oral before breakfast  senna 2 Tablet(s) Oral at bedtime    IVF:    CULTURES:  Culture Results:   No growth to date (01-31 @ 12:57)  Culture Results:   No growth to date (01-31 @ 12:57)    RADIOLOGY & ADDITIONAL TESTS:      ASSESSMENT:  64y Male with HTN, severe neck pain to bilateral shoulders with suspicion of C1-C2 osteomyelitis on MRI, now s/p anterior cervical wound debridement and culture POD#2.    DISCITIS OF CERVICAL REGION  No pertinent family history in first degree relatives  Handoff  MEWS Score  HTN (hypertension)  Lumbar disc herniation  Discitis of cervical region  Discitis of cervical region  H/O hernia repair  MED EVAL      PLAN:  -Continue current pain management, Dr. Stacy following,  -Continue Vancomycin, pending OR cultures for final ID recs,  -Possible PICC line depending on Abx duration,  -PT eval completed, cleared for discharge home,  -PO and OOB as tolerated,  -DC cardiac telemetry,  -SQL to start tonight for DVT prophylaxis,  -Possible transfer of service to Dr. Winston when available,  -D/w Dr. Banegas      Assessment:  Present when checked    []  GCS  E   V  M     Heart Failure: []Acute, [] acute on chronic , []chronic  Heart Failure:  [] Diastolic (HFpEF), [] Systolic (HFrEF), []Combined (HFpEF and HFrEF), [] RHF, [] Pulm HTN, [] Other    [] MICHAEL, [] ATN, [] AIN, [] other  [] CKD1, [] CKD2, [] CKD 3, [] CKD 4, [] CKD 5, []ESRD    Encephalopathy: [] Metabolic, [] Hepatic, [] toxic, [] Neurological, [] Other    Abnormal Nurtitional Status: [] malnurtition (see nutrition note), [ ]underweight: BMI < 19, [] morbid obesity: BMI >40, [] Cachexia    [] Sepsis  [] hypovolemic shock,[] cardiogenic shock, [] hemorrhagic shock, [] neuogenic shock  [] Acute Respiratory Failure  []Cerebral edema, [] Brain compression/ herniation,   [] Functional quadriplegia  [] Acute blood loss anemia HPI:  Pt is a 64 y.o M PMH HTN presenting with neck pain and HA x 2 weeks. Pt has had intermittent severe suboccipital HA and upper neck pain that have been limiting his ROM. Pt has taken advil every other day with no relief and pain is worse with changes in position. Pt also with intermittent subjective fevers during this time. He received an outpatient MRI with Dr. Jacob which showed possible infection at C1-C2. Pt also reports L hand numbness x 1 day. Pt denies vision changes, paresthesias, weakness, N/V, CP, urinary/bowel incontinence. Pt was recently on vacation in [place]. (30 Jan 2020 00:00)    OVERNIGHT EVENTS: Some reports of pain overnight now improved this morning. Tolerating OOB and PO diet.    Hospital Course:  POD 0: s/p washout and culture of prior ACDF site.  POD 1: LORIN overnight cultures pending, maintained on vanco q12h  POD#2 2/2: Pending OR culture results, ID following, on Vanco. Possible PICC line. Pain well controlled.    Vital Signs Last 24 Hrs  T(C): 36.8 (02 Feb 2020 06:10), Max: 37.1 (01 Feb 2020 13:21)  T(F): 98.3 (02 Feb 2020 06:10), Max: 98.8 (01 Feb 2020 13:21)  HR: 79 (02 Feb 2020 06:10) (79 - 97)  BP: 143/91 (02 Feb 2020 06:10) (143/91 - 170/90)  BP(mean): --  RR: 16 (02 Feb 2020 06:10) (16 - 18)  SpO2: 93% (02 Feb 2020 06:10) (93% - 95%)    I&O's Summary    01 Feb 2020 07:01  -  02 Feb 2020 07:00  --------------------------------------------------------  IN: 1450 mL / OUT: 1650 mL / NET: -200 mL        PHYSICAL EXAM:  Gen: NAD, AAOx3  HEENT: PERRL. EOMI. VF intact  Neck: Right anterior incision C/D/I.  Lungs: Clear b/l  Heart: S1, S2. NSR.  Abd: Soft, NT/ND. +BS  Exts: Pulses 2+ throughout  Neuro: CNs II-XII intact. 5/5 str x4 extremities. Sensation to LT intact throughout. Following commands. Speech clear.    TUBES/LINES:  [] Petit  [] Lumbar Drain  [] Wound Drains  [] Others      DIET:  [] NPO  [x] Mechanical  [] Tube feeds    LABS:                        10.5   6.45  )-----------( 299      ( 01 Feb 2020 07:03 )             33.0     02-01    135  |  101  |  9   ----------------------------<  106<H>  4.1   |  22  |  0.77    Ca    8.6      01 Feb 2020 07:03    TPro  6.9  /  Alb  3.3  /  TBili  0.2  /  DBili  x   /  AST  18  /  ALT  12  /  AlkPhos  68  02-01    PT/INR - ( 01 Feb 2020 07:03 )   PT: 13.9 sec;   INR: 1.21          PTT - ( 01 Feb 2020 07:03 )  PTT:34.4 sec        CAPILLARY BLOOD GLUCOSE          Drug Levels: [] N/A  Vancomycin Level, Trough: 11.5 ug/mL (02-01 @ 17:54)    CSF Analysis: [] N/A      Allergies    penicillin (Unknown)    Intolerances      MEDICATIONS:  Antibiotics:  vancomycin  IVPB      vancomycin  IVPB 1250 milliGRAM(s) IV Intermittent every 12 hours    Neuro:  acetaminophen   Tablet .. 650 milliGRAM(s) Oral every 6 hours PRN  HYDROmorphone   Tablet 2 milliGRAM(s) Oral every 4 hours PRN  HYDROmorphone   Tablet 4 milliGRAM(s) Oral every 6 hours PRN  methocarbamol 500 milliGRAM(s) Oral every 8 hours PRN  ondansetron Injectable 4 milliGRAM(s) IV Push every 6 hours PRN  zolpidem 5 milliGRAM(s) Oral at bedtime PRN  zolpidem 5 milliGRAM(s) Oral at bedtime PRN    Anticoagulation:  enoxaparin Injectable 40 milliGRAM(s) SubCutaneous every 24 hours    OTHER:  bisacodyl 5 milliGRAM(s) Oral daily PRN  hydrALAZINE Injectable 5 milliGRAM(s) IV Push every 2 hours PRN  magnesium hydroxide Suspension 30 milliLiter(s) Oral daily PRN  metoprolol succinate  milliGRAM(s) Oral daily  pantoprazole    Tablet 40 milliGRAM(s) Oral before breakfast  senna 2 Tablet(s) Oral at bedtime    IVF:    CULTURES:  Culture Results:   No growth to date (01-31 @ 12:57)  Culture Results:   No growth to date (01-31 @ 12:57)    RADIOLOGY & ADDITIONAL TESTS:      ASSESSMENT:  64y Male with HTN, severe neck pain to bilateral shoulders with suspicion of C1-C2 osteomyelitis on MRI, now s/p anterior cervical wound debridement and culture POD#2.    DISCITIS OF CERVICAL REGION  No pertinent family history in first degree relatives  Handoff  MEWS Score  HTN (hypertension)  Lumbar disc herniation  Discitis of cervical region  Discitis of cervical region  H/O hernia repair  MED EVAL      PLAN:  -Continue current pain management, Dr. Stacy following,  -Pending OR cultures, Dr. Bhandari from ID following:  Change Vancomycin today to Daptomycin 750mg daily and Ceftriaxone 2g daily for 8 weeks total  -PICC line ordered,	  -PT eval completed, cleared for discharge home,  -PO and OOB as tolerated,  -DC cardiac telemetry,  -SQL to start tonight for DVT prophylaxis,  -Possible transfer of service to Dr. Winston when available,  -D/w Dr. Banegas      Assessment:  Present when checked    []  GCS  E   V  M     Heart Failure: []Acute, [] acute on chronic , []chronic  Heart Failure:  [] Diastolic (HFpEF), [] Systolic (HFrEF), []Combined (HFpEF and HFrEF), [] RHF, [] Pulm HTN, [] Other    [] MICHAEL, [] ATN, [] AIN, [] other  [] CKD1, [] CKD2, [] CKD 3, [] CKD 4, [] CKD 5, []ESRD    Encephalopathy: [] Metabolic, [] Hepatic, [] toxic, [] Neurological, [] Other    Abnormal Nurtitional Status: [] malnurtition (see nutrition note), [ ]underweight: BMI < 19, [] morbid obesity: BMI >40, [] Cachexia    [] Sepsis  [] hypovolemic shock,[] cardiogenic shock, [] hemorrhagic shock, [] neuogenic shock  [] Acute Respiratory Failure  []Cerebral edema, [] Brain compression/ herniation,   [] Functional quadriplegia  [] Acute blood loss anemia

## 2020-02-02 NOTE — PROGRESS NOTE ADULT - PROBLEM SELECTOR PLAN 1
await cultures  Vancomycin 1 gram every 12 hours  Vancomycin trough after 4th dose
8 weeks of IV antibiotics  Stop vancomycin  Daptomycin 750 mg every 24 hours for 8 weeks  Ceftriaxone 2 grams every 24 hours for 8 weeks  Weekly CBC with differential , comprehensive metabolic panel, CPK, ESR,CRP

## 2020-02-02 NOTE — PROGRESS NOTE ADULT - ASSESSMENT
Neurosurgery Attending  -In bed sitting up; feels neck mobility (particularly turning to the right) is improving from admission; intermittent localized neck pain in certain positions, not progressively worsening, not radiating into extremities;  -AFebrile  Moving all extremities purposefully without deficit  Right neck wound: clean/dry/intact with dermabond; no swelling/erythema/drainage;  -A/P: Neurologically stable.  No active neurosurgical issues. Right leg x-rays without obvious bony abnormality.  =management as per infectious disease (Dr. Bhandari)  =dvt prophylaxis  =pain meds as per pain management (assistance much appreciated)

## 2020-02-02 NOTE — PROGRESS NOTE ADULT - SUBJECTIVE AND OBJECTIVE BOX
INTERVAL HPI/OVERNIGHT EVENTS:    ANTIBIOTICS    MEDICATIONS  (STANDING):  enoxaparin Injectable 40 milliGRAM(s) SubCutaneous every 24 hours  metoprolol succinate  milliGRAM(s) Oral daily  pantoprazole    Tablet 40 milliGRAM(s) Oral before breakfast  senna 2 Tablet(s) Oral at bedtime  vancomycin  IVPB      vancomycin  IVPB 1250 milliGRAM(s) IV Intermittent every 12 hours    MEDICATIONS  (PRN):  acetaminophen   Tablet .. 650 milliGRAM(s) Oral every 6 hours PRN Temp greater or equal to 38C (100.4F), Mild Pain (1 - 3)  bisacodyl 5 milliGRAM(s) Oral daily PRN Constipation  hydrALAZINE Injectable 5 milliGRAM(s) IV Push every 2 hours PRN SBP>150  HYDROmorphone   Tablet 2 milliGRAM(s) Oral every 4 hours PRN Moderate Pain (4 - 6)  HYDROmorphone   Tablet 4 milliGRAM(s) Oral every 6 hours PRN Severe Pain (7 - 10)  magnesium hydroxide Suspension 30 milliLiter(s) Oral daily PRN Constipation  methocarbamol 500 milliGRAM(s) Oral every 8 hours PRN Muscle Spasm  ondansetron Injectable 4 milliGRAM(s) IV Push every 6 hours PRN Nausea and/or Vomiting  zolpidem 5 milliGRAM(s) Oral at bedtime PRN Insomnia  zolpidem 5 milliGRAM(s) Oral at bedtime PRN Insomnia      Allergies    penicillin (Unknown)    Intolerances        REVIEW OF SYSTEMS:    Constitutional: No fever, weight loss or fatigue  Eyes: No eye pain, visual disturbances, or discharge  ENMT:  No difficulty hearing, tinnitus, vertigo; No sinus or throat pain  Neck: slightly less neck pain today  Respiratory: No cough, wheezing, chills or hemoptysis  Cardiovascular: No chest pain, palpitations, shortness of breath, dizziness or leg swelling  Gastrointestinal: No abdominal or epigastric pain. No nausea, vomiting or hematemesis; No diarrhea or constipation. No melena or hematochezia.  Genitourinary: No dysuria, frequency, hematuria or incontinence  Rectal: No pain, hemorrhoids or incontinence  Neurological: No headaches, memory loss, loss of strength, numbness or tremors  Skin: No itching, burning, rashes or lesions   Lymph Nodes: No enlarged glands  Endocrine: No heat or cold intolerance; No hair loss  Musculoskeletal: No joint pain or swelling; No muscle, back or extremity pain  Heme/Lymph: No easy bruising or bleeding gums  Allergy and Immunologic: No hives or eczema    Vital Signs Last 24 Hrs  T(C): 36.8 (02 Feb 2020 06:10), Max: 37.1 (01 Feb 2020 13:21)  T(F): 98.3 (02 Feb 2020 06:10), Max: 98.8 (01 Feb 2020 13:21)  HR: 79 (02 Feb 2020 06:10) (79 - 97)  BP: 143/91 (02 Feb 2020 06:10) (143/91 - 170/90)  BP(mean): --  RR: 16 (02 Feb 2020 06:10) (16 - 18)  SpO2: 93% (02 Feb 2020 06:10) (93% - 95%)    PHYSICAL EXAM:    General: Well developed; well nourished; in no acute distress  Eyes: PERRL, EOM intact; conjunctiva and sclera clear  Head: Normocephalic; atraumatic  ENMT: No nasal discharge; airway clear  Neck: some tenderness. decreased range of motion  Respiratory: No wheezes, rales or rhonchi  Cardiovascular: Regular rate and rhythm. S1 and S2 Normal; No murmurs, gallops or rubs  Gastrointestinal: Soft non-tender non-distended; Normal bowel sounds; No hepatosplenomegaly  Genitourinary: No costovertebral angle tenderness  Extremities: Normal range of motion, No clubbing, cyanosis or edema  Vascular: Peripheral pulses palpable 2+ bilaterally  Neurological: Alert and oriented x3  Skin: Warm and dry. No acute rash  Lymph Nodes: No acute cervical adenopathy  Musculoskeletal: Normal gait, tone, without deformities    LABS:                        10.5   6.45  )-----------( 299      ( 01 Feb 2020 07:03 )             33.0     02-01    135  |  101  |  9   ----------------------------<  106<H>  4.1   |  22  |  0.77    Ca    8.6      01 Feb 2020 07:03    TPro  6.9  /  Alb  3.3  /  TBili  0.2  /  DBili  x   /  AST  18  /  ALT  12  /  AlkPhos  68  02-01    PT/INR - ( 01 Feb 2020 07:03 )   PT: 13.9 sec;   INR: 1.21          PTT - ( 01 Feb 2020 07:03 )  PTT:34.4 sec        MICROBIOLOGY:  Culture Results:   No growth to date (01-31 @ 12:57)  Culture Results:   No growth to date (01-31 @ 12:57)  Culture Results:   No growth to date (01-31 @ 12:57)  Culture Results:   No growth to date (01-31 @ 12:57)  Culture Results:   No growth to date (01-31 @ 12:57)  Culture Results:   No growth to date (01-31 @ 12:57)  Culture Results:   No growth to date (01-31 @ 12:57)  Culture Results:   No growth to date (01-31 @ 12:57)  Culture Results:   No growth to date (01-31 @ 12:57)  Culture Results:   No growth at 3 days. (01-29 @ 21:30)  Culture Results:   No growth at 3 days. (01-29 @ 21:30)      RADIOLOGY & ADDITIONAL STUDIES:

## 2020-02-03 LAB
ALBUMIN SERPL ELPH-MCNC: 3.7 G/DL — SIGNIFICANT CHANGE UP (ref 3.3–5)
ALP SERPL-CCNC: 78 U/L — SIGNIFICANT CHANGE UP (ref 40–120)
ALT FLD-CCNC: 20 U/L — SIGNIFICANT CHANGE UP (ref 10–45)
ANION GAP SERPL CALC-SCNC: 11 MMOL/L — SIGNIFICANT CHANGE UP (ref 5–17)
AST SERPL-CCNC: 24 U/L — SIGNIFICANT CHANGE UP (ref 10–40)
BASOPHILS # BLD AUTO: 0.01 K/UL — SIGNIFICANT CHANGE UP (ref 0–0.2)
BASOPHILS NFR BLD AUTO: 0.1 % — SIGNIFICANT CHANGE UP (ref 0–2)
BILIRUB SERPL-MCNC: 0.3 MG/DL — SIGNIFICANT CHANGE UP (ref 0.2–1.2)
BUN SERPL-MCNC: 9 MG/DL — SIGNIFICANT CHANGE UP (ref 7–23)
CALCIUM SERPL-MCNC: 9 MG/DL — SIGNIFICANT CHANGE UP (ref 8.4–10.5)
CHLORIDE SERPL-SCNC: 99 MMOL/L — SIGNIFICANT CHANGE UP (ref 96–108)
CK SERPL-CCNC: 119 U/L — SIGNIFICANT CHANGE UP (ref 30–200)
CO2 SERPL-SCNC: 24 MMOL/L — SIGNIFICANT CHANGE UP (ref 22–31)
CREAT SERPL-MCNC: 0.7 MG/DL — SIGNIFICANT CHANGE UP (ref 0.5–1.3)
CRP SERPL-MCNC: 5.16 MG/DL — HIGH (ref 0–0.4)
CULTURE RESULTS: SIGNIFICANT CHANGE UP
EOSINOPHIL # BLD AUTO: 0.03 K/UL — SIGNIFICANT CHANGE UP (ref 0–0.5)
EOSINOPHIL NFR BLD AUTO: 0.4 % — SIGNIFICANT CHANGE UP (ref 0–6)
ERYTHROCYTE [SEDIMENTATION RATE] IN BLOOD: 104 MM/HR — HIGH
GLUCOSE SERPL-MCNC: 118 MG/DL — HIGH (ref 70–99)
HCT VFR BLD CALC: 33.5 % — LOW (ref 39–50)
HGB BLD-MCNC: 11.1 G/DL — LOW (ref 13–17)
IMM GRANULOCYTES NFR BLD AUTO: 0.6 % — SIGNIFICANT CHANGE UP (ref 0–1.5)
LYMPHOCYTES # BLD AUTO: 0.88 K/UL — LOW (ref 1–3.3)
LYMPHOCYTES # BLD AUTO: 10.7 % — LOW (ref 13–44)
MCHC RBC-ENTMCNC: 30 PG — SIGNIFICANT CHANGE UP (ref 27–34)
MCHC RBC-ENTMCNC: 33.1 GM/DL — SIGNIFICANT CHANGE UP (ref 32–36)
MCV RBC AUTO: 90.5 FL — SIGNIFICANT CHANGE UP (ref 80–100)
MONOCYTES # BLD AUTO: 0.67 K/UL — SIGNIFICANT CHANGE UP (ref 0–0.9)
MONOCYTES NFR BLD AUTO: 8.2 % — SIGNIFICANT CHANGE UP (ref 2–14)
NEUTROPHILS # BLD AUTO: 6.56 K/UL — SIGNIFICANT CHANGE UP (ref 1.8–7.4)
NEUTROPHILS NFR BLD AUTO: 80 % — HIGH (ref 43–77)
NRBC # BLD: 0 /100 WBCS — SIGNIFICANT CHANGE UP (ref 0–0)
PLATELET # BLD AUTO: 330 K/UL — SIGNIFICANT CHANGE UP (ref 150–400)
POTASSIUM SERPL-MCNC: 3.7 MMOL/L — SIGNIFICANT CHANGE UP (ref 3.5–5.3)
POTASSIUM SERPL-SCNC: 3.7 MMOL/L — SIGNIFICANT CHANGE UP (ref 3.5–5.3)
PROT SERPL-MCNC: 7.6 G/DL — SIGNIFICANT CHANGE UP (ref 6–8.3)
RBC # BLD: 3.7 M/UL — LOW (ref 4.2–5.8)
RBC # FLD: 11.9 % — SIGNIFICANT CHANGE UP (ref 10.3–14.5)
SODIUM SERPL-SCNC: 134 MMOL/L — LOW (ref 135–145)
SPECIMEN SOURCE: SIGNIFICANT CHANGE UP
WBC # BLD: 8.2 K/UL — SIGNIFICANT CHANGE UP (ref 3.8–10.5)
WBC # FLD AUTO: 8.2 K/UL — SIGNIFICANT CHANGE UP (ref 3.8–10.5)

## 2020-02-03 PROCEDURE — 76937 US GUIDE VASCULAR ACCESS: CPT | Mod: 26,59

## 2020-02-03 PROCEDURE — 36569 INSJ PICC 5 YR+ W/O IMAGING: CPT

## 2020-02-03 RX ORDER — HYDROMORPHONE HYDROCHLORIDE 2 MG/ML
1 INJECTION INTRAMUSCULAR; INTRAVENOUS; SUBCUTANEOUS
Qty: 20 | Refills: 0
Start: 2020-02-03 | End: 2020-02-07

## 2020-02-03 RX ORDER — METOPROLOL TARTRATE 50 MG
1 TABLET ORAL
Qty: 7 | Refills: 0
Start: 2020-02-03 | End: 2020-02-09

## 2020-02-03 RX ORDER — DOCUSATE SODIUM 100 MG
100 CAPSULE ORAL DAILY
Refills: 0 | Status: DISCONTINUED | OUTPATIENT
Start: 2020-02-03 | End: 2020-02-04

## 2020-02-03 RX ORDER — DAPTOMYCIN 500 MG/10ML
750 INJECTION, POWDER, LYOPHILIZED, FOR SOLUTION INTRAVENOUS
Qty: 56 | Refills: 0
Start: 2020-02-03 | End: 2020-03-29

## 2020-02-03 RX ORDER — ACETAMINOPHEN 500 MG
650 TABLET ORAL ONCE
Refills: 0 | Status: COMPLETED | OUTPATIENT
Start: 2020-02-03 | End: 2020-02-03

## 2020-02-03 RX ORDER — PANTOPRAZOLE SODIUM 20 MG/1
1 TABLET, DELAYED RELEASE ORAL
Qty: 0 | Refills: 0 | DISCHARGE
Start: 2020-02-03

## 2020-02-03 RX ORDER — METHOCARBAMOL 500 MG/1
1 TABLET, FILM COATED ORAL
Qty: 21 | Refills: 0
Start: 2020-02-03 | End: 2020-02-09

## 2020-02-03 RX ORDER — CHLORHEXIDINE GLUCONATE 213 G/1000ML
1 SOLUTION TOPICAL
Refills: 0 | Status: DISCONTINUED | OUTPATIENT
Start: 2020-02-04 | End: 2020-02-04

## 2020-02-03 RX ORDER — CEFTRIAXONE 500 MG/1
2 INJECTION, POWDER, FOR SOLUTION INTRAMUSCULAR; INTRAVENOUS
Qty: 56 | Refills: 0
Start: 2020-02-03 | End: 2020-03-29

## 2020-02-03 RX ORDER — SODIUM CHLORIDE 9 MG/ML
10 INJECTION INTRAMUSCULAR; INTRAVENOUS; SUBCUTANEOUS
Refills: 0 | Status: DISCONTINUED | OUTPATIENT
Start: 2020-02-03 | End: 2020-02-04

## 2020-02-03 RX ADMIN — Medication 650 MILLIGRAM(S): at 00:50

## 2020-02-03 RX ADMIN — Medication 650 MILLIGRAM(S): at 10:54

## 2020-02-03 RX ADMIN — Medication 650 MILLIGRAM(S): at 23:15

## 2020-02-03 RX ADMIN — PANTOPRAZOLE SODIUM 40 MILLIGRAM(S): 20 TABLET, DELAYED RELEASE ORAL at 05:18

## 2020-02-03 RX ADMIN — Medication 650 MILLIGRAM(S): at 17:12

## 2020-02-03 RX ADMIN — HYDROMORPHONE HYDROCHLORIDE 4 MILLIGRAM(S): 2 INJECTION INTRAMUSCULAR; INTRAVENOUS; SUBCUTANEOUS at 06:36

## 2020-02-03 RX ADMIN — HYDROMORPHONE HYDROCHLORIDE 4 MILLIGRAM(S): 2 INJECTION INTRAMUSCULAR; INTRAVENOUS; SUBCUTANEOUS at 13:25

## 2020-02-03 RX ADMIN — ENOXAPARIN SODIUM 40 MILLIGRAM(S): 100 INJECTION SUBCUTANEOUS at 20:53

## 2020-02-03 RX ADMIN — DAPTOMYCIN 130 MILLIGRAM(S): 500 INJECTION, POWDER, LYOPHILIZED, FOR SOLUTION INTRAVENOUS at 10:24

## 2020-02-03 RX ADMIN — METHOCARBAMOL 500 MILLIGRAM(S): 500 TABLET, FILM COATED ORAL at 10:54

## 2020-02-03 RX ADMIN — HYDROMORPHONE HYDROCHLORIDE 4 MILLIGRAM(S): 2 INJECTION INTRAMUSCULAR; INTRAVENOUS; SUBCUTANEOUS at 05:42

## 2020-02-03 RX ADMIN — ZOLPIDEM TARTRATE 5 MILLIGRAM(S): 10 TABLET ORAL at 23:11

## 2020-02-03 RX ADMIN — HYDROMORPHONE HYDROCHLORIDE 2 MILLIGRAM(S): 2 INJECTION INTRAMUSCULAR; INTRAVENOUS; SUBCUTANEOUS at 20:49

## 2020-02-03 RX ADMIN — Medication 100 MILLIGRAM(S): at 05:18

## 2020-02-03 RX ADMIN — HYDROMORPHONE HYDROCHLORIDE 4 MILLIGRAM(S): 2 INJECTION INTRAMUSCULAR; INTRAVENOUS; SUBCUTANEOUS at 12:12

## 2020-02-03 RX ADMIN — Medication 650 MILLIGRAM(S): at 05:15

## 2020-02-03 RX ADMIN — Medication 100 MILLIGRAM(S): at 21:33

## 2020-02-03 RX ADMIN — Medication 650 MILLIGRAM(S): at 11:44

## 2020-02-03 RX ADMIN — Medication 650 MILLIGRAM(S): at 06:15

## 2020-02-03 RX ADMIN — METHOCARBAMOL 500 MILLIGRAM(S): 500 TABLET, FILM COATED ORAL at 20:53

## 2020-02-03 RX ADMIN — Medication 650 MILLIGRAM(S): at 17:58

## 2020-02-03 RX ADMIN — CEFTRIAXONE 100 MILLIGRAM(S): 500 INJECTION, POWDER, FOR SOLUTION INTRAMUSCULAR; INTRAVENOUS at 11:02

## 2020-02-03 RX ADMIN — HYDROMORPHONE HYDROCHLORIDE 2 MILLIGRAM(S): 2 INJECTION INTRAMUSCULAR; INTRAVENOUS; SUBCUTANEOUS at 21:40

## 2020-02-03 NOTE — OCCUPATIONAL THERAPY INITIAL EVALUATION ADULT - PERTINENT HX OF CURRENT PROBLEM, REHAB EVAL
Pt is a 64 y.o M PMH HTN presenting with neck pain and HA x 2 weeks. Pt has had intermittent severe suboccipital HA and upper neck pain that have been limiting his ROM. Pt has taken advil every other day with no relief and pain is worse with changes in position. Pt also with intermittent subjective fevers during this time. He received an outpatient MRI with Dr. Jacob which showed possible infection at C1-C2. Pt also reports L hand numbness x 1 day.

## 2020-02-03 NOTE — DISCHARGE NOTE PROVIDER - NSDCCPCAREPLAN_GEN_ALL_CORE_FT
PRINCIPAL DISCHARGE DIAGNOSIS  Diagnosis: Discitis of cervical region  Assessment and Plan of Treatment:

## 2020-02-03 NOTE — DISCHARGE NOTE PROVIDER - HOSPITAL COURSE
HPI:    Pt is a 64 y.o M PMH HTN presenting with neck pain and HA x 2 weeks. Pt has had intermittent severe suboccipital HA and upper neck pain that have been limiting his ROM. Pt has taken advil every other day with no relief and pain is worse with changes in position. Pt also with intermittent subjective fevers during this time. He received an outpatient MRI with Dr. Jacob which showed possible infection at C1-C2. Pt also reports L hand numbness x 1 day. Pt denies vision changes, paresthesias, weakness, N/V, CP, urinary/bowel incontinence. Pt was recently on vacation in [place]. (30 Jan 2020 00:00)        Hospital Course:    POD 0: s/p washout and culture of prior ACDF site.    POD 1: LORIN overnight cultures pending, maintained on vanco q12h    POD#2 2/2: Pending OR culture results, ID following, on Vanco. Possible PICC line. Pain well controlled.    POD#3 2/3 uneventful night PICC line ordered  Continue current IV medications        Pt discharge to home 2/3/2020 for IV antibx for total of 8 weeks as per ID Dr Bhandari

## 2020-02-03 NOTE — DIETITIAN INITIAL EVALUATION ADULT. - ENERGY NEEDS
ABW used for calculations as pt between % of IBW.   ABW 95.3kg, IBW 80kg, 119% IBW, ht 72", BMI 28.5   Nutrient needs based on [facility] standards of care for maintenance in adults, adjusted for post-op needs/ infection, age

## 2020-02-03 NOTE — CONSULT NOTE ADULT - SUBJECTIVE AND OBJECTIVE BOX
Vascular Access Service Consult Note    [facility]x:  Discitis of cervical region: Discitis of cervical region             Diagnosis: osteomyelitis    Indications for Vascular Access (Check all that apply)  [ x ]  Antibiotic Therapy       Antibiotic Prescribed:  dapto, ceftriaxone x8 weeks            [  ]  IV Hydration  [  ]  Total Parenteral Nutrition  [  ]  Chemotherapy  [  ]  Difficult Venous Access  [  ]  CVP monitoring  [  ]  Medications with high potential for tissue necrosis on extravasation  [  ]  Other    Screening (Check all that apply)  Previous Radiation to chest  [  ] Yes      [ x ]  No  Breast Cancer                          [  ] Left     [  ]  Right    [ x ]  No  Lymph Node Dissection         [  ] Left     [  ]  Right    [ x ]  No  Pacemaker or ICD                   [  ] Left     [  ]  Right    [  x]  No  Upper Extremity DVT             [  ] Left     [  ]  Right    [ x ]  No  Chronic Kidney Disease         [  ]  Yes     [ x ]  No  Hemodialysis                           [  ]  Yes     [ x ]  No  AV Fistula/ Graft                     [  ]  Left    [  ]  Right    [ x ]  No  Temp>101F in past 24 H       [  ]  Yes     [x  ]  No  H/O PICC/Midline                   [  ]  Yes     [ x ]  No    Lab data:                        11.1   8.20  )-----------( 330      ( 03 Feb 2020 06:49 )             33.5     02-03    134<L>  |  99  |  9   ----------------------------<  118<H>  3.7   |  24  |  0.70    Ca    9.0      03 Feb 2020 06:49    TPro  7.6  /  Alb  3.7  /  TBili  0.3  /  DBili  x   /  AST  24  /  ALT  20  /  AlkPhos  78  02-03    bcx 1/29 ngtd           I have reviewed the chart, interviewed and examined the patient and determined that this patient:  [ x ] Is a candidate for a PICC line  [  ] Is a candidate for a Midline  [  ] Is not a candidate for vascular access device (reason)    Lumens:    [  ] Single  [ x ] Double

## 2020-02-03 NOTE — DISCHARGE NOTE PROVIDER - NSDCCPTREATMENT_GEN_ALL_CORE_FT
PRINCIPAL PROCEDURE  Procedure: CT cervical spine  Findings and Treatment: s/p ACDF for Cervical Bx to evaluate Biopsy  Return home and call the off ice of Dr Banegas and Dr Bhandari from ID in regard to followup  Home with IV antibx for 8 weeks  Visiting nurse will be set up and have weekely lab draws  Any temper greater than 101.5 F call the office or drainage from the wound  Able to shower and get the wound wet, pat it dry with a clean towel

## 2020-02-03 NOTE — DIETITIAN INITIAL EVALUATION ADULT. - OTHER INFO
64M PMH HTN presenting with neck pain and HA x 2 weeks. Pt has had intermittent severe suboccipital HA and upper neck pain that have been limiting his ROM. Pt has taken advil every other day with no relief and pain is worse with changes in position. Pt also with intermittent subjective fevers during this time. He received an outpatient MRI which showed possible infection at C1-C2. Pt also reports L hand numbness x 1 day. Admitted for management of C1-2 infection. Now s/p anterior cervical wound debridement and culture POD#3. PICC line placed this afternoon for IV abx. Plan for DC home today vs tomorrow once pt feels pain has been better managed and meds titrated down/ home health arranged. Upon exam pt sitting in chair no distress noted other than not being able to rotate head, denies n/v/d/c, chewing/ swallowing issues or pain impacting intake, skin with surgical incision. NKFA or changes in wt. Tolerating diet well 75% turkey sandwich observed consumed. Encouraged intake through day to best meet needs.

## 2020-02-03 NOTE — DISCHARGE NOTE PROVIDER - CARE PROVIDER_API CALL
Taran Banegas)  Neurological Surgery  [id number]  [address] [place] [address], [address]  [place] [id number]  Phone: [phone]  Fax: [phone]  Follow Up Time:     Marshall Bhandari)  Infectious Disease; Internal Medicine  [address] [facility], [address]  [place] [address]  Phone: [phone]  Fax: [phone]  Follow Up Time: Marshall Bhandari)  Infectious Disease; Internal Medicine  [address] [facility], [address]  [place] [address]  Phone: [phone]  Fax: [phone]  Follow Up Time:     Young Winston)  Orthopaedic Surgery  [address], [address] [place] [address]  Phone: [phone]  Fax: [phone]  Follow Up Time:

## 2020-02-03 NOTE — PROCEDURE NOTE - NSPROCDETAILS_GEN_ALL_CORE
sterile technique, catheter placed/ultrasound guidance/supine position/ultrasound assessment/location identified, draped/prepped, sterile technique used/sterile dressing applied

## 2020-02-03 NOTE — DISCHARGE NOTE PROVIDER - PROVIDER TOKENS
PROVIDER:[TOKEN:[[id number]]],PROVIDER:[TOKEN:[[id number]]] PROVIDER:[TOKEN:[[id number]]],PROVIDER:[TOKEN:[[id number]]]

## 2020-02-03 NOTE — PROGRESS NOTE ADULT - ASSESSMENT
Neurosurgery Attending  -Pt. sitting up in bed; yesterday doing well throughout day until 6pm when he had episode of significant neck pain and stiffness; pain medication taken which helped; presently, uncomfortable and just took another pain medication (last one at 5am);  -AFebrile  AOx3;  Motor 5/5;  Posterior upper and paraspinal neck tenderness on palpation (about same as yesterday);  Anterior right surgical incision c/d/i with Dermabond;  -AP: neurologically stable. Has neck discomfort that patient is concerned and would like to be stably managed prior to discharge. Pain management already assisting in medications.  PICC line placed today already and antibiotics tailored by Infectious disease (Dr. Bhandari).  =pain management followup  =switch stool softeners to Colace (patient states has experience with this and tolerates; cannot tolerate Dulcolax);  =dvt prophylaxis  =antibiotic as per infectious disease  =discharge when pain medications titrated well and when home health arranged (patient states this is still being worked on)    Pt. just seen in presence of neurosurgical PA (Issa) and nurse.

## 2020-02-03 NOTE — PROGRESS NOTE ADULT - SUBJECTIVE AND OBJECTIVE BOX
HPI:  Pt is a 64 y.o M PMH HTN presenting with neck pain and HA x 2 weeks. Pt has had intermittent severe suboccipital HA and upper neck pain that have been limiting his ROM. Pt has taken advil every other day with no relief and pain is worse with changes in position. Pt also with intermittent subjective fevers during this time. He received an outpatient MRI with Dr. Jacob which showed possible infection at C1-C2. Pt also reports L hand numbness x 1 day. Pt denies vision changes, paresthesias, weakness, N/V, CP, urinary/bowel incontinence. Pt was recently on vacation in [place]. (30 Jan 2020 00:00)    OVERNIGHT EVENTS:  Vital Signs Last 24 Hrs  T(C): 36.5 (03 Feb 2020 05:45), Max: 37 (02 Feb 2020 09:30)  T(F): 97.7 (03 Feb 2020 05:45), Max: 98.6 (02 Feb 2020 09:30)  HR: 78 (03 Feb 2020 06:26) (76 - 87)  BP: 143/78 (03 Feb 2020 06:26) (143/78 - 165/93)  BP(mean): --  RR: 18 (03 Feb 2020 05:45) (16 - 18)  SpO2: 96% (03 Feb 2020 05:45) (94% - 96%)    I&O's Summary    02 Feb 2020 07:01  -  03 Feb 2020 07:00  --------------------------------------------------------  IN: 480 mL / OUT: 800 mL / NET: -320 mL      Hospital Course:  POD 0: s/p washout and culture of prior ACDF site.  POD 1: LORIN overnight cultures pending, maintained on vanco q12h  POD#2 2/2: Pending OR culture results, ID following, on Vanco. Possible PICC line. Pain well controlled.  POD#3 2/3 uneventful night PICC line ordered  Continue current IV medications        PHYSICAL EXAM:  Gen: NAD, AAOx3  HEENT: PERRL. EOMI. VF intact  Neck: Right anterior incision C/D/I.  Lungs: Clear b/l  Heart: S1, S2. NSR.  Abd: Soft, NT/ND. +BS  Exts: Pulses 2+ throughout  Neuro: CNs II-XII intact. 5/5 str x4 extremities. Sensation to LT intact throughout. Following commands. Speech clear.      DIET: Regular    LABS:                        11.1   8.20  )-----------( 330      ( 03 Feb 2020 06:49 )             33.5     02-03    134<L>  |  99  |  9   ----------------------------<  118<H>  3.7   |  24  |  0.70    Ca    9.0      03 Feb 2020 06:49    TPro  7.6  /  Alb  3.7  /  TBili  0.3  /  DBili  x   /  AST  24  /  ALT  20  /  AlkPhos  78  02-03        CARDIAC MARKERS ( 03 Feb 2020 06:49 )  x     / x     / 119 U/L / x     / x          CAPILLARY BLOOD GLUCOSE      Drug Levels: [] N/A  Vancomycin Level, Trough: 11.5 ug/mL (02-01 @ 17:54)    CSF Analysis: [] N/A  Allergies    penicillin (Unknown)    Intolerances      MEDICATIONS:  Antibiotics:  cefTRIAXone   IVPB      cefTRIAXone   IVPB 2000 milliGRAM(s) IV Intermittent every 24 hours  DAPTOmycin IVPB 750 milliGRAM(s) IV Intermittent every 24 hours  DAPTOmycin IVPB        Neuro:  acetaminophen   Tablet .. 650 milliGRAM(s) Oral every 6 hours PRN  HYDROmorphone   Tablet 2 milliGRAM(s) Oral every 4 hours PRN  HYDROmorphone   Tablet 4 milliGRAM(s) Oral every 6 hours PRN  methocarbamol 500 milliGRAM(s) Oral every 8 hours PRN  ondansetron Injectable 4 milliGRAM(s) IV Push every 6 hours PRN  zolpidem 5 milliGRAM(s) Oral at bedtime PRN  zolpidem 5 milliGRAM(s) Oral at bedtime PRN    Anticoagulation:  enoxaparin Injectable 40 milliGRAM(s) SubCutaneous every 24 hours    OTHER:  bisacodyl 5 milliGRAM(s) Oral daily PRN  hydrALAZINE Injectable 5 milliGRAM(s) IV Push every 2 hours PRN  magnesium hydroxide Suspension 30 milliLiter(s) Oral daily PRN  metoprolol succinate  milliGRAM(s) Oral daily  pantoprazole    Tablet 40 milliGRAM(s) Oral before breakfast  senna 2 Tablet(s) Oral at bedtime    IVF:    CULTURES:  Culture Results:   No growth to date (01-31 @ 12:57)  Culture Results:   No growth to date (01-31 @ 12:57)    RADIOLOGY & ADDITIONAL TESTS:      ASSESSMENT:    64y Male with HTN, severe neck pain to bilateral shoulders with suspicion of C1-C2 osteomyelitis on MRI, now s/p anterior cervical wound debridement and culture POD#2.    PLAN:    NEURO:    Monitor Neuro status  OT/PT  IV antibx as per ID  weekly labs  PICC line ordered  Pain Management  Continue current medical regime    Dispo: Discussed with attendidng      DVT PROPHYLAXIS:  [x] Venodynes                                [] Heparin/Lovenox    FALL RISK:  [] Low Risk                                    [] Impulsive  Assessment:  Present when checked    []  GCS  E   V  M     Heart Failure: []Acute, [] acute on chronic , []chronic  Heart Failure:  [] Diastolic (HFpEF), [] Systolic (HFrEF), []Combined (HFpEF and HFrEF), [] RHF, [] Pulm HTN, [] Other    [] MICHAEL, [] ATN, [] AIN, [] other  [] CKD1, [] CKD2, [] CKD 3, [] CKD 4, [] CKD 5, []ESRD    Encephalopathy: [] Metabolic, [] Hepatic, [] toxic, [] Neurological, [] Other    Abnormal Nurtitional Status: [] malnurtition (see nutrition note), [ ]underweight: BMI < 19, [] morbid obesity: BMI >40, [] Cachexia    [] Sepsis  [] hypovolemic shock,[] cardiogenic shock, [] hemorrhagic shock, [] neuogenic shock  [] Acute Respiratory Failure  []Cerebral edema, [] Brain compression/ herniation,   [] Functional quadriplegia  [] Acute blood loss anemia

## 2020-02-03 NOTE — DISCHARGE NOTE NURSING/CASE MANAGEMENT/SOCIAL WORK - PATIENT PORTAL LINK FT
You can access the FollowMyHealth Patient Portal offered by [facility] by registering at the following website: [email or web address]. By joining [facility]’s FollowMyHealth portal, you will also be able to view your health information using other applications (apps) compatible with our system.

## 2020-02-04 VITALS
OXYGEN SATURATION: 96 % | TEMPERATURE: 98 F | HEART RATE: 92 BPM | RESPIRATION RATE: 17 BRPM | DIASTOLIC BLOOD PRESSURE: 90 MMHG | SYSTOLIC BLOOD PRESSURE: 162 MMHG

## 2020-02-04 RX ORDER — DOCUSATE SODIUM 100 MG
1 CAPSULE ORAL
Qty: 0 | Refills: 0 | DISCHARGE
Start: 2020-02-04

## 2020-02-04 RX ORDER — METHOCARBAMOL 500 MG/1
1 TABLET, FILM COATED ORAL
Qty: 21 | Refills: 0
Start: 2020-02-04 | End: 2020-02-10

## 2020-02-04 RX ORDER — METOPROLOL TARTRATE 50 MG
1 TABLET ORAL
Qty: 30 | Refills: 0
Start: 2020-02-04 | End: 2020-03-04

## 2020-02-04 RX ORDER — HYDROMORPHONE HYDROCHLORIDE 2 MG/ML
1 INJECTION INTRAMUSCULAR; INTRAVENOUS; SUBCUTANEOUS
Qty: 20 | Refills: 0
Start: 2020-02-04 | End: 2020-02-08

## 2020-02-04 RX ADMIN — HYDROMORPHONE HYDROCHLORIDE 2 MILLIGRAM(S): 2 INJECTION INTRAMUSCULAR; INTRAVENOUS; SUBCUTANEOUS at 05:02

## 2020-02-04 RX ADMIN — DAPTOMYCIN 130 MILLIGRAM(S): 500 INJECTION, POWDER, LYOPHILIZED, FOR SOLUTION INTRAVENOUS at 09:58

## 2020-02-04 RX ADMIN — HYDROMORPHONE HYDROCHLORIDE 2 MILLIGRAM(S): 2 INJECTION INTRAMUSCULAR; INTRAVENOUS; SUBCUTANEOUS at 06:01

## 2020-02-04 RX ADMIN — HYDROMORPHONE HYDROCHLORIDE 2 MILLIGRAM(S): 2 INJECTION INTRAMUSCULAR; INTRAVENOUS; SUBCUTANEOUS at 11:34

## 2020-02-04 RX ADMIN — HYDROMORPHONE HYDROCHLORIDE 2 MILLIGRAM(S): 2 INJECTION INTRAMUSCULAR; INTRAVENOUS; SUBCUTANEOUS at 12:05

## 2020-02-04 RX ADMIN — Medication 100 MILLIGRAM(S): at 05:02

## 2020-02-04 RX ADMIN — CEFTRIAXONE 100 MILLIGRAM(S): 500 INJECTION, POWDER, FOR SOLUTION INTRAMUSCULAR; INTRAVENOUS at 09:52

## 2020-02-04 RX ADMIN — CHLORHEXIDINE GLUCONATE 1 APPLICATION(S): 213 SOLUTION TOPICAL at 05:10

## 2020-02-04 RX ADMIN — PANTOPRAZOLE SODIUM 40 MILLIGRAM(S): 20 TABLET, DELAYED RELEASE ORAL at 05:02

## 2020-02-04 RX ADMIN — Medication 650 MILLIGRAM(S): at 00:15

## 2020-02-04 RX ADMIN — METHOCARBAMOL 500 MILLIGRAM(S): 500 TABLET, FILM COATED ORAL at 09:50

## 2020-02-04 NOTE — PROGRESS NOTE ADULT - REASON FOR ADMISSION
C1-C2 infection

## 2020-02-04 NOTE — PROGRESS NOTE ADULT - SUBJECTIVE AND OBJECTIVE BOX
Patient seen, examined.  I am covering today for Dr. Bassam Koo is doing well, planning on discharge today.  No new findings on examination  Patient acknowledges understanding to call for any questions, fevers, chills or any new pains or symptoms.  Plan to follow-up with me for the cervical spine/surgery and Dr. Bhandari (ID) for antibiotics

## 2020-02-04 NOTE — PROGRESS NOTE ADULT - SUBJECTIVE AND OBJECTIVE BOX
HPI:  Pt is a 64 y.o M PMH HTN presenting with neck pain and HA x 2 weeks. Pt has had intermittent severe suboccipital HA and upper neck pain that have been limiting his ROM. Pt has taken advil every other day with no relief and pain is worse with changes in position. Pt also with intermittent subjective fevers during this time. He received an outpatient MRI with Dr. Jacob which showed possible infection at C1-C2. Pt also reports L hand numbness x 1 day. Pt denies vision changes, paresthesias, weakness, N/V, CP, urinary/bowel incontinence. Pt was recently on vacation in [place]. (30 Jan 2020 00:00)    OVERNIGHT EVENTS: No significant events overnight, pain well controlled.    Hospital Course:  POD 0: s/p washout and culture of prior ACDF site.  POD 1: LORIN overnight cultures pending, maintained on vanco q12h  POD#2 2/2: Pending OR culture results, ID following, on Vanco. Possible PICC line. Pain well controlled.  POD#3 2/3 uneventful night PICC line ordered  Continue current IV medications  POD#4 2/4: PICC line placed, pending discharge home on IV Abx.    Vital Signs Last 24 Hrs  T(C): 36.8 (03 Feb 2020 20:42), Max: 36.8 (03 Feb 2020 20:42)  T(F): 98.2 (03 Feb 2020 20:42), Max: 98.2 (03 Feb 2020 20:42)  HR: 83 (03 Feb 2020 23:16) (78 - 83)  BP: 154/90 (03 Feb 2020 23:16) (143/78 - 170/95)  BP(mean): --  RR: 17 (03 Feb 2020 20:42) (16 - 18)  SpO2: 95% (03 Feb 2020 20:42) (94% - 97%)    I&O's Summary    02 Feb 2020 07:01  -  03 Feb 2020 07:00  --------------------------------------------------------  IN: 480 mL / OUT: 800 mL / NET: -320 mL    03 Feb 2020 07:01  -  04 Feb 2020 03:52  --------------------------------------------------------  IN: 100 mL / OUT: 0 mL / NET: 100 mL        PHYSICAL EXAM:  Gen: NAD, AAOx3  HEENT: PERRL. EOMI. VF intact  Neck: Right anterior incision C/D/I.  Lungs: Clear b/l  Heart: S1, S2. NSR.  Abd: Soft, NT/ND. +BS  Exts: Pulses 2+ throughout  Neuro: CNs II-XII intact. 5/5 str x4 extremities. Sensation to LT intact throughout. Following commands. Speech clear.    TUBES/LINES:  [] Petit  [] Lumbar Drain  [] Wound Drains  [] Others      DIET:  [] NPO  [x] Mechanical  [] Tube feeds    LABS:                        11.1   8.20  )-----------( 330      ( 03 Feb 2020 06:49 )             33.5     02-03    134<L>  |  99  |  9   ----------------------------<  118<H>  3.7   |  24  |  0.70    Ca    9.0      03 Feb 2020 06:49    TPro  7.6  /  Alb  3.7  /  TBili  0.3  /  DBili  x   /  AST  24  /  ALT  20  /  AlkPhos  78  02-03        CARDIAC MARKERS ( 03 Feb 2020 06:49 )  x     / x     / 119 U/L / x     / x          CAPILLARY BLOOD GLUCOSE          Drug Levels: [] N/A  Vancomycin Level, Trough: 11.5 ug/mL (02-01 @ 17:54)    CSF Analysis: [] N/A      Allergies    penicillin (Unknown)    Intolerances      MEDICATIONS:  Antibiotics:  cefTRIAXone   IVPB      cefTRIAXone   IVPB 2000 milliGRAM(s) IV Intermittent every 24 hours  DAPTOmycin IVPB 750 milliGRAM(s) IV Intermittent every 24 hours  DAPTOmycin IVPB        Neuro:  acetaminophen   Tablet .. 650 milliGRAM(s) Oral every 6 hours PRN  HYDROmorphone   Tablet 2 milliGRAM(s) Oral every 4 hours PRN  HYDROmorphone   Tablet 4 milliGRAM(s) Oral every 6 hours PRN  methocarbamol 500 milliGRAM(s) Oral every 8 hours PRN  ondansetron Injectable 4 milliGRAM(s) IV Push every 6 hours PRN  zolpidem 5 milliGRAM(s) Oral at bedtime PRN  zolpidem 5 milliGRAM(s) Oral at bedtime PRN    Anticoagulation:  enoxaparin Injectable 40 milliGRAM(s) SubCutaneous every 24 hours    OTHER:  chlorhexidine 2% Cloths 1 Application(s) Topical <User Schedule>  docusate sodium 100 milliGRAM(s) Oral daily  hydrALAZINE Injectable 5 milliGRAM(s) IV Push every 2 hours PRN  magnesium hydroxide Suspension 30 milliLiter(s) Oral daily PRN  metoprolol succinate  milliGRAM(s) Oral daily  pantoprazole    Tablet 40 milliGRAM(s) Oral before breakfast  senna 2 Tablet(s) Oral at bedtime    IVF:    CULTURES:  Culture Results:   Testing in progress (01-31 @ 23:13)  Culture Results:   Testing in progress (01-31 @ 23:12)    RADIOLOGY & ADDITIONAL TESTS:      ASSESSMENT: 64y Male with HTN, severe neck pain to bilateral shoulders with suspicion of C1-C2 osteomyelitis on MRI, now s/p anterior cervical wound debridement and culture POD#3.      DISCITIS OF CERVICAL REGION  No pertinent family history in first degree relatives  Handoff  MEWS Score  HTN (hypertension)  Lumbar disc herniation  CT cervical spine  Discitis of cervical region  Discitis of cervical region  H/O hernia repair  MED EVAL      PLAN:  Continue antibiotics as ordered via PICC line,  Pain meds PRN,  SCDs, SQL for DVT prophylaxis,  Plan for discharge home w/ PICC services,  D/w Dr. Banegas    Assessment:  Present when checked    []  GCS  E   V  M     Heart Failure: []Acute, [] acute on chronic , []chronic  Heart Failure:  [] Diastolic (HFpEF), [] Systolic (HFrEF), []Combined (HFpEF and HFrEF), [] RHF, [] Pulm HTN, [] Other    [] MICHAEL, [] ATN, [] AIN, [] other  [] CKD1, [] CKD2, [] CKD 3, [] CKD 4, [] CKD 5, []ESRD    Encephalopathy: [] Metabolic, [] Hepatic, [] toxic, [] Neurological, [] Other    Abnormal Nurtitional Status: [] malnurtition (see nutrition note), [ ]underweight: BMI < 19, [] morbid obesity: BMI >40, [] Cachexia    [] Sepsis  [] hypovolemic shock,[] cardiogenic shock, [] hemorrhagic shock, [] neuogenic shock  [] Acute Respiratory Failure  []Cerebral edema, [] Brain compression/ herniation,   [] Functional quadriplegia  [] Acute blood loss anemia

## 2020-02-07 PROCEDURE — 36415 COLL VENOUS BLD VENIPUNCTURE: CPT

## 2020-02-07 PROCEDURE — 80053 COMPREHEN METABOLIC PANEL: CPT

## 2020-02-07 PROCEDURE — 86901 BLOOD TYPING SEROLOGIC RH(D): CPT

## 2020-02-07 PROCEDURE — 87040 BLOOD CULTURE FOR BACTERIA: CPT

## 2020-02-07 PROCEDURE — 87075 CULTR BACTERIA EXCEPT BLOOD: CPT

## 2020-02-07 PROCEDURE — 85025 COMPLETE CBC W/AUTO DIFF WBC: CPT

## 2020-02-07 PROCEDURE — 76000 FLUOROSCOPY <1 HR PHYS/QHP: CPT

## 2020-02-07 PROCEDURE — 83605 ASSAY OF LACTIC ACID: CPT

## 2020-02-07 PROCEDURE — 73590 X-RAY EXAM OF LOWER LEG: CPT

## 2020-02-07 PROCEDURE — 36573 INSJ PICC RS&I 5 YR+: CPT

## 2020-02-07 PROCEDURE — 86850 RBC ANTIBODY SCREEN: CPT

## 2020-02-07 PROCEDURE — 71045 X-RAY EXAM CHEST 1 VIEW: CPT

## 2020-02-07 PROCEDURE — 72125 CT NECK SPINE W/O DYE: CPT

## 2020-02-07 PROCEDURE — 86140 C-REACTIVE PROTEIN: CPT

## 2020-02-07 PROCEDURE — 82550 ASSAY OF CK (CPK): CPT

## 2020-02-07 PROCEDURE — 85730 THROMBOPLASTIN TIME PARTIAL: CPT

## 2020-02-07 PROCEDURE — 87070 CULTURE OTHR SPECIMN AEROBIC: CPT

## 2020-02-07 PROCEDURE — 85610 PROTHROMBIN TIME: CPT

## 2020-02-07 PROCEDURE — 97161 PT EVAL LOW COMPLEX 20 MIN: CPT

## 2020-02-07 PROCEDURE — 88304 TISSUE EXAM BY PATHOLOGIST: CPT

## 2020-02-07 PROCEDURE — 85027 COMPLETE CBC AUTOMATED: CPT

## 2020-02-07 PROCEDURE — 83735 ASSAY OF MAGNESIUM: CPT

## 2020-02-07 PROCEDURE — 80048 BASIC METABOLIC PNL TOTAL CA: CPT

## 2020-02-07 PROCEDURE — 87186 SC STD MICRODIL/AGAR DIL: CPT

## 2020-02-07 PROCEDURE — 72156 MRI NECK SPINE W/O & W/DYE: CPT

## 2020-02-07 PROCEDURE — 70450 CT HEAD/BRAIN W/O DYE: CPT

## 2020-02-07 PROCEDURE — 87116 MYCOBACTERIA CULTURE: CPT

## 2020-02-07 PROCEDURE — 85652 RBC SED RATE AUTOMATED: CPT

## 2020-02-07 PROCEDURE — 80051 ELECTROLYTE PANEL: CPT

## 2020-02-07 PROCEDURE — 87102 FUNGUS ISOLATION CULTURE: CPT

## 2020-02-07 PROCEDURE — 86803 HEPATITIS C AB TEST: CPT

## 2020-02-07 PROCEDURE — 80202 ASSAY OF VANCOMYCIN: CPT

## 2020-02-07 PROCEDURE — A9585: CPT

## 2020-02-07 PROCEDURE — 93306 TTE W/DOPPLER COMPLETE: CPT

## 2020-02-07 PROCEDURE — 84100 ASSAY OF PHOSPHORUS: CPT

## 2020-02-07 PROCEDURE — 99285 EMERGENCY DEPT VISIT HI MDM: CPT | Mod: 25

## 2020-02-07 PROCEDURE — 87206 SMEAR FLUORESCENT/ACID STAI: CPT

## 2020-02-07 PROCEDURE — 83036 HEMOGLOBIN GLYCOSYLATED A1C: CPT

## 2020-02-09 LAB
-  CEFAZOLIN: SIGNIFICANT CHANGE UP
-  CLINDAMYCIN: SIGNIFICANT CHANGE UP
-  ERYTHROMYCIN: SIGNIFICANT CHANGE UP
-  LINEZOLID: SIGNIFICANT CHANGE UP
-  OXACILLIN: SIGNIFICANT CHANGE UP
-  PENICILLIN: SIGNIFICANT CHANGE UP
-  RIFAMPIN: SIGNIFICANT CHANGE UP
-  TRIMETHOPRIM/SULFAMETHOXAZOLE: SIGNIFICANT CHANGE UP
-  VANCOMYCIN: SIGNIFICANT CHANGE UP
METHOD TYPE: SIGNIFICANT CHANGE UP

## 2020-02-10 LAB — SURGICAL PATHOLOGY STUDY: SIGNIFICANT CHANGE UP

## 2020-02-11 DIAGNOSIS — I10 ESSENTIAL (PRIMARY) HYPERTENSION: ICD-10-CM

## 2020-02-11 DIAGNOSIS — M46.42 DISCITIS, UNSPECIFIED, CERVICAL REGION: ICD-10-CM

## 2020-02-11 DIAGNOSIS — M46.32: ICD-10-CM

## 2020-02-11 DIAGNOSIS — M46.22 OSTEOMYELITIS OF VERTEBRA, CERVICAL REGION: ICD-10-CM

## 2020-02-11 DIAGNOSIS — M54.2 CERVICALGIA: ICD-10-CM

## 2020-02-11 DIAGNOSIS — J39.0 RETROPHARYNGEAL AND PARAPHARYNGEAL ABSCESS: ICD-10-CM

## 2020-02-11 DIAGNOSIS — G06.1 INTRASPINAL ABSCESS AND GRANULOMA: ICD-10-CM

## 2020-02-29 LAB

## 2020-03-01 LAB
CULTURE RESULTS: NO GROWTH — SIGNIFICANT CHANGE UP
CULTURE RESULTS: NO GROWTH — SIGNIFICANT CHANGE UP
CULTURE RESULTS: SIGNIFICANT CHANGE UP
SPECIMEN SOURCE: SIGNIFICANT CHANGE UP

## 2020-03-03 LAB
CULTURE RESULTS: NO GROWTH — SIGNIFICANT CHANGE UP
CULTURE RESULTS: NO GROWTH — SIGNIFICANT CHANGE UP
SPECIMEN SOURCE: SIGNIFICANT CHANGE UP
SPECIMEN SOURCE: SIGNIFICANT CHANGE UP

## 2020-03-04 LAB
CULTURE RESULTS: NO GROWTH — SIGNIFICANT CHANGE UP
CULTURE RESULTS: SIGNIFICANT CHANGE UP
ORGANISM # SPEC MICROSCOPIC CNT: SIGNIFICANT CHANGE UP
SPECIMEN SOURCE: SIGNIFICANT CHANGE UP

## 2020-03-21 LAB

## 2020-05-20 NOTE — PATIENT PROFILE ADULT - NSPROPTRIGHTSUPPORTPERSON_GEN_A_NUR
[Fatigue] : fatigue [Recent Change In Weight] : ~T recent weight change [SOB on Exertion] : shortness of breath during exertion [Vomiting] : vomiting [Negative] : Heme/Lymph [FreeTextEntry7] : nausea declines [FreeTextEntry9] : as above

## 2020-06-08 PROBLEM — I10 ESSENTIAL (PRIMARY) HYPERTENSION: Chronic | Status: ACTIVE | Noted: 2020-01-30

## 2020-06-08 PROBLEM — M51.26 OTHER INTERVERTEBRAL DISC DISPLACEMENT, LUMBAR REGION: Chronic | Status: ACTIVE | Noted: 2020-01-29

## 2020-06-09 VITALS
HEIGHT: 72 IN | WEIGHT: 217.6 LBS | RESPIRATION RATE: 16 BRPM | HEART RATE: 88 BPM | DIASTOLIC BLOOD PRESSURE: 84 MMHG | OXYGEN SATURATION: 97 % | TEMPERATURE: 98 F | SYSTOLIC BLOOD PRESSURE: 122 MMHG

## 2020-06-09 NOTE — H&P ADULT - HISTORY OF PRESENT ILLNESS
63yo male co cervical spine pain x .   Patient presents for elective posterior cervical fusion C1-C2. 65yo male co cervical spine pain/popping since March when he had an infection within the spine worsened over time without improvement. Failed conservative treatments. Denies numbness, tingling. Ambulates without assist. Pt denies any recent fevers/chills, chest pain, body aches, shortness of breath, sick contacts. Denies history of DVT/PE. Presents today for elective posterior cervical fusion C1-C2.

## 2020-06-09 NOTE — H&P ADULT - NSHPPHYSICALEXAM_GEN_ALL_CORE
General:  Alert and oriented, NAD  MSK:  Decreased ROM of cervical spine secondary to pain.       Remainder of PE as per medical clearance NAD, sitting comfortably in chair.  MSK: Decreased ROM of cervical spine secondary to pain, sensation intact to light touch in bilateral upper extremities, radial pulse 2+ bilaterally, /biceps/triceps/deltoids 5/5  Rest of PE per medical clearance

## 2020-06-09 NOTE — H&P ADULT - ATTENDING COMMENTS
***Notes/documentation by others:  Despite the "electronic signature" I provide in this electronic note and/or elsewhere in this electronic chart, the contents of this note, and/or documentation/notes by others in this chart/electronic record, have not been reviewed for accuracy.  Hospital policy requires me to provide timely electronic signatures in the medical records but it is not my usual and customary practice to review the documentation and/or notes of others.  As a result, I cannot attest to the accuracy or contents of this note/documentation and/or the notes/documentation of others.  NLT

## 2020-06-09 NOTE — H&P ADULT - NSHPLABSRESULTS_GEN_ALL_CORE
Preop cbc, bmp, pt/inr, ptt, ua wnl reviewed by medical clearance.  3M DOS  F/u COVID swab DOS   preop cxr wnl reviewed by medical clearance  preop ekg shows no acute changes reviewed by medical clearance   echo mild mitral and tricuspid regurgitation LVEF 60%

## 2020-06-09 NOTE — H&P ADULT - NSICDXPASTMEDICALHX_GEN_ALL_CORE_FT
PAST MEDICAL HISTORY:  HTN (hypertension)     Lumbar disc herniation     Subluxation of C1/c2 cervical vertebrae, initial encounter

## 2020-06-09 NOTE — H&P ADULT - PROBLEM SELECTOR PLAN 1
Admit to orthopedics.  Presents for elective PCF C1-C2  Medically optimized and cleared for surgery by Dr. Hutchins

## 2020-06-10 ENCOUNTER — INPATIENT (INPATIENT)
Facility: HOSPITAL | Age: 65
LOS: 0 days | Discharge: HOME | DRG: 472 | End: 2020-06-11
Attending: ORTHOPAEDIC SURGERY | Admitting: ORTHOPAEDIC SURGERY
Payer: COMMERCIAL

## 2020-06-10 DIAGNOSIS — I10 ESSENTIAL (PRIMARY) HYPERTENSION: ICD-10-CM

## 2020-06-10 DIAGNOSIS — Z98.890 OTHER SPECIFIED POSTPROCEDURAL STATES: Chronic | ICD-10-CM

## 2020-06-10 DIAGNOSIS — S13.120A SUBLUXATION OF C1/C2 CERVICAL VERTEBRAE, INITIAL ENCOUNTER: ICD-10-CM

## 2020-06-10 RX ORDER — KETOROLAC TROMETHAMINE 30 MG/ML
30 SYRINGE (ML) INJECTION EVERY 6 HOURS
Refills: 0 | Status: DISCONTINUED | OUTPATIENT
Start: 2020-06-10 | End: 2020-06-10

## 2020-06-10 RX ORDER — OXYCODONE HYDROCHLORIDE 5 MG/1
5 TABLET ORAL EVERY 4 HOURS
Refills: 0 | Status: DISCONTINUED | OUTPATIENT
Start: 2020-06-10 | End: 2020-06-11

## 2020-06-10 RX ORDER — KETOROLAC TROMETHAMINE 30 MG/ML
15 SYRINGE (ML) INJECTION EVERY 4 HOURS
Refills: 0 | Status: DISCONTINUED | OUTPATIENT
Start: 2020-06-10 | End: 2020-06-10

## 2020-06-10 RX ORDER — CLONAZEPAM 1 MG
0.5 TABLET ORAL AT BEDTIME
Refills: 0 | Status: DISCONTINUED | OUTPATIENT
Start: 2020-06-10 | End: 2020-06-11

## 2020-06-10 RX ORDER — ACETAMINOPHEN 500 MG
1000 TABLET ORAL ONCE
Refills: 0 | Status: COMPLETED | OUTPATIENT
Start: 2020-06-10 | End: 2020-06-11

## 2020-06-10 RX ORDER — ZOLPIDEM TARTRATE 10 MG/1
5 TABLET ORAL AT BEDTIME
Refills: 0 | Status: DISCONTINUED | OUTPATIENT
Start: 2020-06-10 | End: 2020-06-11

## 2020-06-10 RX ORDER — OXYCODONE HYDROCHLORIDE 5 MG/1
10 TABLET ORAL EVERY 4 HOURS
Refills: 0 | Status: DISCONTINUED | OUTPATIENT
Start: 2020-06-10 | End: 2020-06-11

## 2020-06-10 RX ORDER — ONDANSETRON 8 MG/1
4 TABLET, FILM COATED ORAL EVERY 6 HOURS
Refills: 0 | Status: DISCONTINUED | OUTPATIENT
Start: 2020-06-10 | End: 2020-06-11

## 2020-06-10 RX ORDER — HYDROMORPHONE HYDROCHLORIDE 2 MG/ML
0.5 INJECTION INTRAMUSCULAR; INTRAVENOUS; SUBCUTANEOUS ONCE
Refills: 0 | Status: DISCONTINUED | OUTPATIENT
Start: 2020-06-10 | End: 2020-06-10

## 2020-06-10 RX ORDER — HYDROMORPHONE HYDROCHLORIDE 2 MG/ML
0.5 INJECTION INTRAMUSCULAR; INTRAVENOUS; SUBCUTANEOUS
Refills: 0 | Status: DISCONTINUED | OUTPATIENT
Start: 2020-06-10 | End: 2020-06-11

## 2020-06-10 RX ORDER — VANCOMYCIN HCL 1 G
1500 VIAL (EA) INTRAVENOUS ONCE
Refills: 0 | Status: COMPLETED | OUTPATIENT
Start: 2020-06-10 | End: 2020-06-10

## 2020-06-10 RX ORDER — ZOLPIDEM TARTRATE 10 MG/1
1 TABLET ORAL
Qty: 0 | Refills: 0 | DISCHARGE

## 2020-06-10 RX ORDER — METOPROLOL TARTRATE 50 MG
100 TABLET ORAL DAILY
Refills: 0 | Status: DISCONTINUED | OUTPATIENT
Start: 2020-06-10 | End: 2020-06-11

## 2020-06-10 RX ORDER — ACETAMINOPHEN 500 MG
650 TABLET ORAL EVERY 6 HOURS
Refills: 0 | Status: DISCONTINUED | OUTPATIENT
Start: 2020-06-10 | End: 2020-06-10

## 2020-06-10 RX ORDER — SENNA PLUS 8.6 MG/1
2 TABLET ORAL AT BEDTIME
Refills: 0 | Status: DISCONTINUED | OUTPATIENT
Start: 2020-06-10 | End: 2020-06-11

## 2020-06-10 RX ORDER — CLONAZEPAM 1 MG
1 TABLET ORAL
Qty: 0 | Refills: 0 | DISCHARGE

## 2020-06-10 RX ORDER — SODIUM CHLORIDE 9 MG/ML
1000 INJECTION, SOLUTION INTRAVENOUS
Refills: 0 | Status: DISCONTINUED | OUTPATIENT
Start: 2020-06-10 | End: 2020-06-11

## 2020-06-10 RX ADMIN — SENNA PLUS 2 TABLET(S): 8.6 TABLET ORAL at 21:25

## 2020-06-10 RX ADMIN — Medication 30 MILLIGRAM(S): at 19:33

## 2020-06-10 RX ADMIN — SODIUM CHLORIDE 120 MILLILITER(S): 9 INJECTION, SOLUTION INTRAVENOUS at 13:50

## 2020-06-10 RX ADMIN — ZOLPIDEM TARTRATE 5 MILLIGRAM(S): 10 TABLET ORAL at 22:31

## 2020-06-10 RX ADMIN — HYDROMORPHONE HYDROCHLORIDE 0.5 MILLIGRAM(S): 2 INJECTION INTRAMUSCULAR; INTRAVENOUS; SUBCUTANEOUS at 16:09

## 2020-06-10 RX ADMIN — Medication 15 MILLIGRAM(S): at 14:55

## 2020-06-10 RX ADMIN — Medication 30 MILLIGRAM(S): at 13:15

## 2020-06-10 RX ADMIN — Medication 300 MILLIGRAM(S): at 21:25

## 2020-06-10 RX ADMIN — OXYCODONE HYDROCHLORIDE 10 MILLIGRAM(S): 5 TABLET ORAL at 21:24

## 2020-06-10 NOTE — PROGRESS NOTE ADULT - SUBJECTIVE AND OBJECTIVE BOX
Orthopaedic Surgery Post Operative Check    Procedure: posterior cervical fusion C1-C2  Surgeon: Dr. Winston    Called by PACU RN that patient is complaining of "headache." Patient seen and examined in the PACU. Patient denies headache, stating that he has "7/10 pain" in the "back of his neck" where his surgical incision is.   Denies CP, SOB, N/V, numbness/tingling    Vital Signs Last 24 Hrs  T(C): 36.6 (06-10-20 @ 12:36), Max: 36.6 (06-10-20 @ 12:36)  T(F): 97.9 (06-10-20 @ 12:36), Max: 97.9 (06-10-20 @ 12:36)  HR: 102 (06-10-20 @ 14:16) (102 - 110)  BP: 140/64 (06-10-20 @ 14:16) (112/63 - 140/64)  BP(mean): 87 (06-10-20 @ 14:16) (81 - 98)  RR: 14 (06-10-20 @ 14:16) (8 - 16)  SpO2: 95% (06-10-20 @ 14:16) (91% - 95%)  AVSS    General: Pt Alert and oriented, NAD  DSG C/D/I neck prevena, hvx1  Skin warm and well perfused   Sensation: intact to bilateral upper extremities   Motor:  strength 5/5 bilateral upper extremities. biceps/triceps strength 5/5 bilateral upper extremities.         A/P: 64yMale POD#0 s/p PCF C1-2  - Stable  - Pain Control  - DVT ppx: scds   - Post op abx: vancomycin   - PT, WBS:  wbat   - f/u AM labs     Ortho Pager [phone]

## 2020-06-10 NOTE — PROGRESS NOTE ADULT - SUBJECTIVE AND OBJECTIVE BOX
Patient seen, examined in preop area chairside.   Exam is unchanged with regard to neurological examination  The condition and treatment options were discussed with the patient.  The risks, benefits and alternatives to C1-2 posterior spinal fusion with screws/rods and sublaminar wire and donated bone graft surgery was discussed. The primary goal of surgery is to stability C1-2 and prevent further instability and/or catastrophic neurological injury.   The complications of surgery were discussed and include, but are not limited to, wound problems, infection, bleeding, vascular injury, nerve injury, paralysis, cerebrospinal fluid leak, persistent symptoms, hardware failure, loss of fixation, non-union, pseudarthrosis, junctional/adjacent level disease, difficulty swallowing, hoarse voice, vocal cord paralysis, retropharyngeal injury, deep vein thrombosis, pulmonary embolus, myocardial infarction, stroke, death and need for additional surgery.    All questions were answered, the patient acknowledges understanding the above and informed consent was obtained.    ***Notes/documentation by others:  Despite the "electronic signature" I have provided in this electronic note and/or chart, the contents of this note (and notes by others in this chart) have not been reviewed for accuracy.  Hospital policy requires me to provide an electronic signature but it is not my usual and customary practice to review the notes of others.  As a result, I cannot attest to the accuracy or contents of notes/documentation by others.  NLT

## 2020-06-11 VITALS
HEART RATE: 90 BPM | SYSTOLIC BLOOD PRESSURE: 137 MMHG | TEMPERATURE: 98 F | RESPIRATION RATE: 13 BRPM | OXYGEN SATURATION: 93 % | DIASTOLIC BLOOD PRESSURE: 79 MMHG

## 2020-06-11 LAB
ANION GAP SERPL CALC-SCNC: 12 MMOL/L — SIGNIFICANT CHANGE UP (ref 5–17)
BASOPHILS # BLD AUTO: 0 K/UL — SIGNIFICANT CHANGE UP (ref 0–0.2)
BASOPHILS NFR BLD AUTO: 0 % — SIGNIFICANT CHANGE UP (ref 0–2)
BUN SERPL-MCNC: 12 MG/DL — SIGNIFICANT CHANGE UP (ref 7–23)
CALCIUM SERPL-MCNC: 8.9 MG/DL — SIGNIFICANT CHANGE UP (ref 8.4–10.5)
CHLORIDE SERPL-SCNC: 102 MMOL/L — SIGNIFICANT CHANGE UP (ref 96–108)
CO2 SERPL-SCNC: 23 MMOL/L — SIGNIFICANT CHANGE UP (ref 22–31)
CREAT SERPL-MCNC: 0.81 MG/DL — SIGNIFICANT CHANGE UP (ref 0.5–1.3)
EOSINOPHIL # BLD AUTO: 0 K/UL — SIGNIFICANT CHANGE UP (ref 0–0.5)
EOSINOPHIL NFR BLD AUTO: 0 % — SIGNIFICANT CHANGE UP (ref 0–6)
GLUCOSE SERPL-MCNC: 138 MG/DL — HIGH (ref 70–99)
HCT VFR BLD CALC: 35.7 % — LOW (ref 39–50)
HGB BLD-MCNC: 11.7 G/DL — LOW (ref 13–17)
IMM GRANULOCYTES NFR BLD AUTO: 0.5 % — SIGNIFICANT CHANGE UP (ref 0–1.5)
LYMPHOCYTES # BLD AUTO: 0.81 K/UL — LOW (ref 1–3.3)
LYMPHOCYTES # BLD AUTO: 12.5 % — LOW (ref 13–44)
MCHC RBC-ENTMCNC: 28.5 PG — SIGNIFICANT CHANGE UP (ref 27–34)
MCHC RBC-ENTMCNC: 32.8 GM/DL — SIGNIFICANT CHANGE UP (ref 32–36)
MCV RBC AUTO: 87.1 FL — SIGNIFICANT CHANGE UP (ref 80–100)
MONOCYTES # BLD AUTO: 0.53 K/UL — SIGNIFICANT CHANGE UP (ref 0–0.9)
MONOCYTES NFR BLD AUTO: 8.2 % — SIGNIFICANT CHANGE UP (ref 2–14)
NEUTROPHILS # BLD AUTO: 5.12 K/UL — SIGNIFICANT CHANGE UP (ref 1.8–7.4)
NEUTROPHILS NFR BLD AUTO: 78.8 % — HIGH (ref 43–77)
NRBC # BLD: 0 /100 WBCS — SIGNIFICANT CHANGE UP (ref 0–0)
PLATELET # BLD AUTO: 171 K/UL — SIGNIFICANT CHANGE UP (ref 150–400)
POTASSIUM SERPL-MCNC: 4.6 MMOL/L — SIGNIFICANT CHANGE UP (ref 3.5–5.3)
POTASSIUM SERPL-SCNC: 4.6 MMOL/L — SIGNIFICANT CHANGE UP (ref 3.5–5.3)
RBC # BLD: 4.1 M/UL — LOW (ref 4.2–5.8)
RBC # FLD: 13.4 % — SIGNIFICANT CHANGE UP (ref 10.3–14.5)
SODIUM SERPL-SCNC: 137 MMOL/L — SIGNIFICANT CHANGE UP (ref 135–145)
WBC # BLD: 6.49 K/UL — SIGNIFICANT CHANGE UP (ref 3.8–10.5)
WBC # FLD AUTO: 6.49 K/UL — SIGNIFICANT CHANGE UP (ref 3.8–10.5)

## 2020-06-11 PROCEDURE — 85025 COMPLETE CBC W/AUTO DIFF WBC: CPT

## 2020-06-11 PROCEDURE — 76000 FLUOROSCOPY <1 HR PHYS/QHP: CPT

## 2020-06-11 PROCEDURE — 80048 BASIC METABOLIC PNL TOTAL CA: CPT

## 2020-06-11 PROCEDURE — 36415 COLL VENOUS BLD VENIPUNCTURE: CPT

## 2020-06-11 PROCEDURE — 95940 IONM IN OPERATNG ROOM 15 MIN: CPT

## 2020-06-11 PROCEDURE — C1889: CPT

## 2020-06-11 PROCEDURE — C1713: CPT

## 2020-06-11 RX ORDER — OXYCODONE HYDROCHLORIDE 5 MG/1
1 TABLET ORAL
Qty: 30 | Refills: 0
Start: 2020-06-11

## 2020-06-11 RX ADMIN — Medication 100 MILLIGRAM(S): at 05:16

## 2020-06-11 RX ADMIN — OXYCODONE HYDROCHLORIDE 10 MILLIGRAM(S): 5 TABLET ORAL at 03:02

## 2020-06-11 RX ADMIN — Medication 400 MILLIGRAM(S): at 05:25

## 2020-06-11 RX ADMIN — HYDROMORPHONE HYDROCHLORIDE 0.5 MILLIGRAM(S): 2 INJECTION INTRAMUSCULAR; INTRAVENOUS; SUBCUTANEOUS at 07:13

## 2020-06-11 RX ADMIN — ZOLPIDEM TARTRATE 5 MILLIGRAM(S): 10 TABLET ORAL at 00:12

## 2020-06-11 RX ADMIN — OXYCODONE HYDROCHLORIDE 10 MILLIGRAM(S): 5 TABLET ORAL at 09:25

## 2020-06-11 NOTE — DISCHARGE NOTE PROVIDER - NSDCCPTREATMENT_GEN_ALL_CORE_FT
PRINCIPAL PROCEDURE  Procedure: Posterior cervical fusion with instrumentation  Findings and Treatment: C1-2

## 2020-06-11 NOTE — DISCHARGE NOTE PROVIDER - CARE PROVIDER_API CALL
Young Winston  ORTHOPAEDIC SURGERY  [address]  [place] [address]  Phone: [phone]  Fax: [phone]  Follow Up Time: 2 weeks

## 2020-06-11 NOTE — DISCHARGE NOTE PROVIDER - NSDCCPCAREPLAN_GEN_ALL_CORE_FT
PRINCIPAL DISCHARGE DIAGNOSIS  Diagnosis: Subluxation of C1/c2 cervical vertebrae, initial encounter  Assessment and Plan of Treatment: improvement s/p PSF C1-2

## 2020-06-11 NOTE — PROGRESS NOTE ADULT - SUBJECTIVE AND OBJECTIVE BOX
patient seen, examined.  Did well overnight; reports expected postoperative neck pain, managed well with medications.  Swallowing without difficulty, has been voiding without difficulty.   Vocalizing well.  Tolerating fluids well.  Has not been out of bed yet.  PE:   dressing dry and intact; drain in place- min drainage (40 total)  motor 5/5  sensory intact to light touch  deep tendon reflexes unchanged.  no calf tenderness    imp:  pod #1 C1-2 fusion  pain management  remove drain this AM; will keep nida in place for 7 days  sequential compression devices while in bed  incentive spirometry   Out of bed with PT/nursing and if cleared plan discharge to home progressive ambulation  avoid lifting, twisting, bending  no  exposure to cigarette smoke, Non-steroidal anti-inflammatories  vitamin D, calcium, metamucil/colace  encourage hydration  call/go to ER for any, fevers, chills or any wound drainage  follow-up in 7-10 days

## 2020-06-11 NOTE — DISCHARGE NOTE PROVIDER - NSDCFUADDINST_GEN_ALL_CORE_FT
No strenuous activity (bending/twisting), heavy lifting, driving or returning to work until cleared by MD.  Change dressing daily with gauze/tape till post-op day #5, then leave incision open to air.  You may shower post-op day#5, keep incision clean and dry.   Try to have regular bowel movements, take stool softener or laxative if necessary.  May apply ice to affected areas to decrease swelling.  Call to schedule an appt with Dr. Winston for follow up, if you have staples or sutures they will be removed in office.  Contact your doctor if you experience: fever greater than 101.5, chills, chest pain, difficulty breathing, redness or excessive drainage around the incision, other concerns.  Follow up with your primary care provider. No strenuous activity (bending/twisting), heavy lifting, driving or returning to work until cleared by MD.  Dressing - you have an incisional wound vac dressing with attached canister and battery pack. You may shower but must keep battery pack dry at all times. The battery dies in 7 days then can remove dressing and leave open to air. Soap and water ok, pat dry. Keep incision clean.    Try to have regular bowel movements, take stool softener or laxative if necessary.  May apply ice to affected areas to decrease swelling.  Call to schedule an appt with Dr. Winston for follow up, if you have staples or sutures they will be removed in office.  Contact your doctor if you experience: fever greater than 101.5, chills, chest pain, difficulty breathing, redness or excessive drainage around the incision, other concerns.  Follow up with your primary care provider.

## 2020-06-11 NOTE — DISCHARGE NOTE PROVIDER - HOSPITAL COURSE
Admitted 6/10    Surgery 6/10 PSF C1-2    Aviva-op Antibiotics    Pain control    DVT prophylaxis    OOB/Physical Therapy Admitted 6/10    Attending: Dr. Winston     Surgery 6/10 PSF C1-2    Aviva-op Antibiotics    Pain control    DVT prophylaxis    OOB/Physical Therapy Admitted 6/10    Attending: Dr. Winston     Surgery 6/10 PSF C1-2    Aviva-op Antibiotics    Pain control    DVT prophylaxis    OOB/Physical Therapy     ***Notes/documentation by others:  Despite the "electronic signature" I provide in this electronic note and/or elsewhere in this electronic chart, the contents of this note, and/or documentation/notes by others in this chart/electronic record, have not been reviewed for accuracy.  Hospital policy requires me to provide timely electronic signatures in the medical records but it is not my usual and customary practice to review the documentation and/or notes of others.  As a result, I cannot attest to the accuracy or contents of this note/documentation and/or the notes/documentation of others.  NLT

## 2020-06-11 NOTE — DISCHARGE NOTE PROVIDER - NSDCMRMEDTOKEN_GEN_ALL_CORE_FT
Ambien 10 mg oral tablet: 1 tab(s) orally once a day (at bedtime)  KlonoPIN 0.5 mg oral tablet: 1 tab(s) orally once a day, As Needed  metoprolol succinate 100 mg oral tablet, extended release: 1 tab(s) orally once a day MDD:1 Ambien 10 mg oral tablet: 1 tab(s) orally once a day (at bedtime)  KlonoPIN 0.5 mg oral tablet: 1 tab(s) orally once a day, As Needed  metoprolol succinate 100 mg oral tablet, extended release: 1 tab(s) orally once a day MDD:1  oxyCODONE 5 mg oral tablet: 1-2 tab(s) orally every 4-6 hours, As needed, Moderate to severe pain MDD:6

## 2020-06-11 NOTE — DISCHARGE NOTE NURSING/CASE MANAGEMENT/SOCIAL WORK - PATIENT PORTAL LINK FT
You can access the FollowMyHealth Patient Portal offered by [facility] by registering at the following website: [email or web address]. By joining [employer]’s FollowMyHealth portal, you will also be able to view your health information using other applications (apps) compatible with our system.

## 2020-06-22 DIAGNOSIS — B94.9 SEQUELAE OF UNSPECIFIED INFECTIOUS AND PARASITIC DISEASE: ICD-10-CM
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
## 2020-06-22 DIAGNOSIS — Z88.0 ALLERGY STATUS TO PENICILLIN: ICD-10-CM

## 2020-06-22 DIAGNOSIS — M53.2X2 SPINAL INSTABILITIES, CERVICAL REGION: ICD-10-CM

## 2020-06-22 DIAGNOSIS — S13.120A SUBLUXATION OF C1/C2 CERVICAL VERTEBRAE, INITIAL ENCOUNTER: ICD-10-CM

## 2020-06-22 DIAGNOSIS — I10 ESSENTIAL (PRIMARY) HYPERTENSION: ICD-10-CM

## 2020-06-22 DIAGNOSIS — M48.061 SPINAL STENOSIS, LUMBAR REGION WITHOUT NEUROGENIC CLAUDICATION: ICD-10-CM

## 2021-03-05 ENCOUNTER — EMERGENCY (EMERGENCY)
Facility: HOSPITAL | Age: 66
LOS: 1 days | Discharge: HOME | End: 2021-03-05
Attending: EMERGENCY MEDICINE | Admitting: EMERGENCY MEDICINE
Payer: COMMERCIAL

## 2021-03-05 VITALS
SYSTOLIC BLOOD PRESSURE: 146 MMHG | HEIGHT: 72 IN | TEMPERATURE: 98 F | RESPIRATION RATE: 20 BRPM | DIASTOLIC BLOOD PRESSURE: 85 MMHG | OXYGEN SATURATION: 95 % | HEART RATE: 71 BPM

## 2021-03-05 DIAGNOSIS — Z79.891 LONG TERM (CURRENT) USE OF OPIATE ANALGESIC: ICD-10-CM

## 2021-03-05 DIAGNOSIS — Z20.822 CONTACT WITH AND (SUSPECTED) EXPOSURE TO COVID-19: ICD-10-CM

## 2021-03-05 DIAGNOSIS — Z79.899 OTHER LONG TERM (CURRENT) DRUG THERAPY: ICD-10-CM

## 2021-03-05 DIAGNOSIS — Z98.890 OTHER SPECIFIED POSTPROCEDURAL STATES: Chronic | ICD-10-CM

## 2021-03-05 DIAGNOSIS — Z88.0 ALLERGY STATUS TO PENICILLIN: ICD-10-CM

## 2021-03-05 PROBLEM — S13.120A: Chronic | Status: ACTIVE | Noted: 2020-06-09

## 2021-03-05 PROCEDURE — 99282 EMERGENCY DEPT VISIT SF MDM: CPT

## 2021-03-05 NOTE — ED PROVIDER NOTE - NSFOLLOWUPINSTRUCTIONS_ED_ALL_ED_FT
Your test results may take 1-3 days. You will get a text/email.  Please check the patient online portal (Dorothy and website) for results. You can create a portal account at [email or web address]. [facility] [place]. If you have old records with [employer] or [facility]  or encounter any difficulties with us you will need to call the HELP line to merge results [id number] (Mon-Fri 8a-5p).    Please follow the instructions on provided coronavirus discharge educational forms and if needed self quarantine for 14 days.     If you test positive for COVID 19:    1. STAY HOME for 14 DAYS  2. Minimize human contact to ONLY ESSENTIAL  3. Every time you wash your hands, sing the HAPPY BIRTHDAY song so you know you're washing long enough.  Make sure to scrub the webspace between your fingers.  4. DRINK 1-3 Liters of fluids day x at least 5 days.  To remain hydrated. Your fatigue, lightheadedness, and body aches will decrease and your fever has a better chance of breaking if you are well hydrated.    5. For your Fever and Body aches takes Tylenol 650-100mg every 4-6h (max 4000mg/day). Try not to use ibuprofen, aspirin or naproxen (Advil, Motrin or Aleve) as these may worsen Coronavirus infection.  6. Use an inhaler for mild shortness of breath and cough  7. RETURN TO THE ER IMMEDIATELY IF YOU HAVE WORSENING SHORTNESS OF BREATH  8. TAKE THE FOLLOWING SUPPLEMENTS DAILY.        VITAMIN C 1000MG ONCE DAILY.        VITAMIN D 200IU ONCE DAILY.        ZINC 50MG ONCE DAILY.

## 2021-03-05 NOTE — ED PROVIDER NOTE - PATIENT PORTAL LINK FT
Regular diet  
You can access the FollowMyHealth Patient Portal offered by [facility] by registering at the following website: [email or web address]. By joining [employer]’s FollowMyHealth portal, you will also be able to view your health information using other applications (apps) compatible with our system.

## 2021-03-06 LAB — SARS-COV-2 RNA SPEC QL NAA+PROBE: SIGNIFICANT CHANGE UP

## 2021-04-28 NOTE — PRE-OP CHECKLIST - WEIGHT IN KG
Date of Procedure: 04/28/21


Preoperative Diagnosis: 


Acute appendicitis


Postoperative Diagnosis: 


Acute appendicitis


Procedure(s) Performed: 


Laparoscopic appendectomy


Anesthesia: SRAVANTHI


Surgeon: Charles Nguyen


Estimated Blood Loss (ml): 5


Pathology: other (Appendix)


Condition: stable


Disposition: PACU


Description of Procedure: 


HarThe patient's placed on the operating table in the supine position.  The 

patient received general anesthesia.  The abdomen was prepped and draped in the 

usual sterile fashion.  The skin was anesthetized 1% local Xylocaine at the 

trocar sites.  Using an 11 blade the skin was incised at the umbilicus.  The 

umbilicus was grasped with a Olivia clamp and then a Veress needle was placed 

into the peritoneal cavity.  Position of the Veress needle was confirmed with 

positive drop test.  After adequate insufflation a 5 mm trocar was placed into 

the peritoneal cavity.  The abdomen was further insufflated.  And then the 

laparoscope was placed in the peritoneal cavity.  Next a 5 mm trocar was placed 

in the midline suprapubic position.  And then a 10 mm trocar was placed in the 

midline epigastric position.  The patient was rotated with the right side up and

in Trendelenburg.  The appendix was visualized.  The appendix appeared to be 

inflamed.  The appendix was grasped and then using the Harmonic scissors the 

mesoappendix was divided.  A PDS Endoloop was then placed around the base of the

appendix.  And then the appendix was divided using Harmonic scissors.  The 

appendix was placed into an Endo Catch and brought out through the 10 mm trocar 

site.  The abdomen was irrigated.  There is no bleeding seen.  The trochars 

withdrawn.  The skin was closed interrupted 3-0 Monocryl suture.  Dermabond 

dressing was applied.  Patient was sent to recovery room in stable condition. 95.3

## 2022-04-21 NOTE — PHYSICAL THERAPY INITIAL EVALUATION ADULT - PERTINENT HX OF CURRENT PROBLEM, REHAB EVAL
Pt stated a few weeks ago he was on vacation, started to experience stabbing neck pain. 21-Apr-2022 13:20